# Patient Record
Sex: MALE | Race: ASIAN | NOT HISPANIC OR LATINO | URBAN - METROPOLITAN AREA
[De-identification: names, ages, dates, MRNs, and addresses within clinical notes are randomized per-mention and may not be internally consistent; named-entity substitution may affect disease eponyms.]

---

## 2022-05-07 ENCOUNTER — INPATIENT (INPATIENT)
Facility: HOSPITAL | Age: 52
LOS: 1 days | Discharge: HOME | End: 2022-05-09
Attending: HOSPITALIST | Admitting: HOSPITALIST
Payer: MEDICAID

## 2022-05-07 VITALS
SYSTOLIC BLOOD PRESSURE: 137 MMHG | HEART RATE: 113 BPM | RESPIRATION RATE: 15 BRPM | TEMPERATURE: 98 F | DIASTOLIC BLOOD PRESSURE: 96 MMHG | OXYGEN SATURATION: 99 %

## 2022-05-07 DIAGNOSIS — J18.9 PNEUMONIA, UNSPECIFIED ORGANISM: ICD-10-CM

## 2022-05-07 LAB
ALBUMIN SERPL ELPH-MCNC: 3 G/DL — LOW (ref 3.3–5)
ALP SERPL-CCNC: 301 U/L — HIGH (ref 40–120)
ALT FLD-CCNC: 206 U/L — HIGH (ref 4–41)
ANION GAP SERPL CALC-SCNC: 12 MMOL/L — SIGNIFICANT CHANGE UP (ref 7–14)
APPEARANCE UR: CLEAR — SIGNIFICANT CHANGE UP
AST SERPL-CCNC: 195 U/L — HIGH (ref 4–40)
BASOPHILS # BLD AUTO: 0.03 K/UL — SIGNIFICANT CHANGE UP (ref 0–0.2)
BASOPHILS NFR BLD AUTO: 0.2 % — SIGNIFICANT CHANGE UP (ref 0–2)
BILIRUB SERPL-MCNC: 0.4 MG/DL — SIGNIFICANT CHANGE UP (ref 0.2–1.2)
BILIRUB UR-MCNC: NEGATIVE — SIGNIFICANT CHANGE UP
BLOOD GAS VENOUS COMPREHENSIVE RESULT: SIGNIFICANT CHANGE UP
BUN SERPL-MCNC: 14 MG/DL — SIGNIFICANT CHANGE UP (ref 7–23)
CALCIUM SERPL-MCNC: 8.8 MG/DL — SIGNIFICANT CHANGE UP (ref 8.4–10.5)
CHLORIDE SERPL-SCNC: 101 MMOL/L — SIGNIFICANT CHANGE UP (ref 98–107)
CO2 SERPL-SCNC: 24 MMOL/L — SIGNIFICANT CHANGE UP (ref 22–31)
COLOR SPEC: YELLOW — SIGNIFICANT CHANGE UP
CREAT SERPL-MCNC: 0.68 MG/DL — SIGNIFICANT CHANGE UP (ref 0.5–1.3)
DIFF PNL FLD: NEGATIVE — SIGNIFICANT CHANGE UP
EGFR: 112 ML/MIN/1.73M2 — SIGNIFICANT CHANGE UP
EOSINOPHIL # BLD AUTO: 0.12 K/UL — SIGNIFICANT CHANGE UP (ref 0–0.5)
EOSINOPHIL NFR BLD AUTO: 0.8 % — SIGNIFICANT CHANGE UP (ref 0–6)
FLUAV AG NPH QL: SIGNIFICANT CHANGE UP
FLUBV AG NPH QL: SIGNIFICANT CHANGE UP
GLUCOSE SERPL-MCNC: 124 MG/DL — HIGH (ref 70–99)
GLUCOSE UR QL: NEGATIVE — SIGNIFICANT CHANGE UP
HCT VFR BLD CALC: 34.5 % — LOW (ref 39–50)
HGB BLD-MCNC: 11.3 G/DL — LOW (ref 13–17)
IANC: 12.73 K/UL — HIGH (ref 1.8–7.4)
IMM GRANULOCYTES NFR BLD AUTO: 0.6 % — SIGNIFICANT CHANGE UP (ref 0–1.5)
KETONES UR-MCNC: NEGATIVE — SIGNIFICANT CHANGE UP
LEUKOCYTE ESTERASE UR-ACNC: NEGATIVE — SIGNIFICANT CHANGE UP
LYMPHOCYTES # BLD AUTO: 1.51 K/UL — SIGNIFICANT CHANGE UP (ref 1–3.3)
LYMPHOCYTES # BLD AUTO: 9.7 % — LOW (ref 13–44)
MCHC RBC-ENTMCNC: 30.6 PG — SIGNIFICANT CHANGE UP (ref 27–34)
MCHC RBC-ENTMCNC: 32.8 GM/DL — SIGNIFICANT CHANGE UP (ref 32–36)
MCV RBC AUTO: 93.5 FL — SIGNIFICANT CHANGE UP (ref 80–100)
MONOCYTES # BLD AUTO: 1.06 K/UL — HIGH (ref 0–0.9)
MONOCYTES NFR BLD AUTO: 6.8 % — SIGNIFICANT CHANGE UP (ref 2–14)
NEUTROPHILS # BLD AUTO: 12.73 K/UL — HIGH (ref 1.8–7.4)
NEUTROPHILS NFR BLD AUTO: 81.9 % — HIGH (ref 43–77)
NITRITE UR-MCNC: NEGATIVE — SIGNIFICANT CHANGE UP
NRBC # BLD: 0 /100 WBCS — SIGNIFICANT CHANGE UP
NRBC # FLD: 0 K/UL — SIGNIFICANT CHANGE UP
PH UR: 8.5 — HIGH (ref 5–8)
PLATELET # BLD AUTO: 544 K/UL — HIGH (ref 150–400)
POTASSIUM SERPL-MCNC: 4.3 MMOL/L — SIGNIFICANT CHANGE UP (ref 3.5–5.3)
POTASSIUM SERPL-SCNC: 4.3 MMOL/L — SIGNIFICANT CHANGE UP (ref 3.5–5.3)
PROT SERPL-MCNC: 8.4 G/DL — HIGH (ref 6–8.3)
PROT UR-MCNC: ABNORMAL
RBC # BLD: 3.69 M/UL — LOW (ref 4.2–5.8)
RBC # FLD: 13.1 % — SIGNIFICANT CHANGE UP (ref 10.3–14.5)
RSV RNA NPH QL NAA+NON-PROBE: SIGNIFICANT CHANGE UP
SARS-COV-2 RNA SPEC QL NAA+PROBE: SIGNIFICANT CHANGE UP
SODIUM SERPL-SCNC: 137 MMOL/L — SIGNIFICANT CHANGE UP (ref 135–145)
SP GR SPEC: 1.03 — SIGNIFICANT CHANGE UP (ref 1–1.05)
UROBILINOGEN FLD QL: ABNORMAL
WBC # BLD: 15.54 K/UL — HIGH (ref 3.8–10.5)
WBC # FLD AUTO: 15.54 K/UL — HIGH (ref 3.8–10.5)

## 2022-05-07 PROCEDURE — 71045 X-RAY EXAM CHEST 1 VIEW: CPT | Mod: 26

## 2022-05-07 PROCEDURE — 76705 ECHO EXAM OF ABDOMEN: CPT | Mod: 26

## 2022-05-07 PROCEDURE — 99285 EMERGENCY DEPT VISIT HI MDM: CPT

## 2022-05-07 PROCEDURE — 74177 CT ABD & PELVIS W/CONTRAST: CPT | Mod: 26,MA

## 2022-05-07 RX ORDER — SODIUM CHLORIDE 9 MG/ML
1000 INJECTION INTRAMUSCULAR; INTRAVENOUS; SUBCUTANEOUS ONCE
Refills: 0 | Status: COMPLETED | OUTPATIENT
Start: 2022-05-07 | End: 2022-05-07

## 2022-05-07 RX ORDER — SODIUM CHLORIDE 9 MG/ML
1000 INJECTION, SOLUTION INTRAVENOUS ONCE
Refills: 0 | Status: COMPLETED | OUTPATIENT
Start: 2022-05-07 | End: 2022-05-07

## 2022-05-07 RX ORDER — LANOLIN ALCOHOL/MO/W.PET/CERES
3 CREAM (GRAM) TOPICAL AT BEDTIME
Refills: 0 | Status: DISCONTINUED | OUTPATIENT
Start: 2022-05-07 | End: 2022-05-09

## 2022-05-07 RX ORDER — PIPERACILLIN AND TAZOBACTAM 4; .5 G/20ML; G/20ML
3.38 INJECTION, POWDER, LYOPHILIZED, FOR SOLUTION INTRAVENOUS ONCE
Refills: 0 | Status: COMPLETED | OUTPATIENT
Start: 2022-05-07 | End: 2022-05-07

## 2022-05-07 RX ORDER — ENOXAPARIN SODIUM 100 MG/ML
40 INJECTION SUBCUTANEOUS EVERY 24 HOURS
Refills: 0 | Status: DISCONTINUED | OUTPATIENT
Start: 2022-05-07 | End: 2022-05-09

## 2022-05-07 RX ORDER — KETOROLAC TROMETHAMINE 30 MG/ML
15 SYRINGE (ML) INJECTION ONCE
Refills: 0 | Status: DISCONTINUED | OUTPATIENT
Start: 2022-05-07 | End: 2022-05-07

## 2022-05-07 RX ADMIN — SODIUM CHLORIDE 1000 MILLILITER(S): 9 INJECTION INTRAMUSCULAR; INTRAVENOUS; SUBCUTANEOUS at 15:12

## 2022-05-07 RX ADMIN — Medication 3 MILLIGRAM(S): at 23:38

## 2022-05-07 RX ADMIN — ENOXAPARIN SODIUM 40 MILLIGRAM(S): 100 INJECTION SUBCUTANEOUS at 23:38

## 2022-05-07 RX ADMIN — Medication 15 MILLIGRAM(S): at 15:12

## 2022-05-07 RX ADMIN — PIPERACILLIN AND TAZOBACTAM 200 GRAM(S): 4; .5 INJECTION, POWDER, LYOPHILIZED, FOR SOLUTION INTRAVENOUS at 17:37

## 2022-05-07 RX ADMIN — SODIUM CHLORIDE 1000 MILLILITER(S): 9 INJECTION, SOLUTION INTRAVENOUS at 23:39

## 2022-05-07 NOTE — ED ADULT TRIAGE NOTE - CHIEF COMPLAINT QUOTE
Pt c/o chest pain radiating to left upper back beginning last night, worse this morning. pain described as "heaviness." endorsing SOB, pt respirations even and unlabored w/ O2 sat on RA 99%. Pt denies dizziness, weakness, n/v. Denies PMHx.

## 2022-05-07 NOTE — ED PROVIDER NOTE - PROGRESS NOTE DETAILS
JAISON Humphreys. PGY-3: CT AP showing L lower lung PNA, given abx. Will admit for IV abx and continued monitoring, remains tachypneic but not requiring O2, no retractions on exam

## 2022-05-07 NOTE — ED PROVIDER NOTE - ATTENDING CONTRIBUTION TO CARE
1yom with unknown PMH presents with 3 weeks of fevers a/w L flank and chest pain. Denies cough, sob, +n/v, no recent travel. Pt stable on exam, notable tachycardia in s/o fever, given abx, plan for CTAP to eval for kidney stone/infarct/abscess. LFTs also elevated and pt with some R sided pain as well, RUQ US ordered to eval for cholecystitis.

## 2022-05-07 NOTE — ED PROVIDER NOTE - PHYSICAL EXAMINATION
Mentation: AAO x 3  psych: mood appropriate  ENT: airway patent  Eyes: conjunctivae clear bilaterally  Cardio: RRR, no m/r/g  Resp: normal BS b/l  GI: mild LUQ tenderness  : +L CVA tenderness  Neuro: sensation and motor function intact  Skin: No evidence of rash  MSK: normal movement of all extremities  Lymph/Vasc: no LE edema

## 2022-05-07 NOTE — ED ADULT NURSE NOTE - OBJECTIVE STATEMENT
Pt AOX4 c/o 15 days of Left flank pain and fever, 10 days of Left-sided chest pain, denies SOB; denies any urinary symptoms; wife states he has been losing weight and not eating or drinking much; skin dry warm and intact; rectal temp 100.6; pt denies n,v, d; takes no meds but hasn't seen an MD in decades. Labs drawn and sent. Awaiting further orders.

## 2022-05-07 NOTE — ED PROVIDER NOTE - OBJECTIVE STATEMENT
52 y/o M with no reported PMH presenting with L sided chest/abd pain x2 weeks. Patient reports fevers x1 week as well. States gets improvement of fever with tylenol or motrin. Denies any cough, sob, n/v/d, dysuria, hematuria.  Patient has not seen a doctor ever

## 2022-05-07 NOTE — ED PROVIDER NOTE - CLINICAL SUMMARY MEDICAL DECISION MAKING FREE TEXT BOX
JAISON Humphreys. PGY-3: 50 y/o M presenting with 1 week of fevers, +CVA tenderness on exam, will eval for pyelo/septic stone. labs, cultures, CXR, CT AP, fluids, anti-pyretics. Dispo pending

## 2022-05-08 DIAGNOSIS — R07.9 CHEST PAIN, UNSPECIFIED: ICD-10-CM

## 2022-05-08 DIAGNOSIS — R74.01 ELEVATION OF LEVELS OF LIVER TRANSAMINASE LEVELS: ICD-10-CM

## 2022-05-08 DIAGNOSIS — F10.10 ALCOHOL ABUSE, UNCOMPLICATED: ICD-10-CM

## 2022-05-08 DIAGNOSIS — D64.9 ANEMIA, UNSPECIFIED: ICD-10-CM

## 2022-05-08 DIAGNOSIS — R01.1 CARDIAC MURMUR, UNSPECIFIED: ICD-10-CM

## 2022-05-08 DIAGNOSIS — I10 ESSENTIAL (PRIMARY) HYPERTENSION: ICD-10-CM

## 2022-05-08 DIAGNOSIS — Z29.9 ENCOUNTER FOR PROPHYLACTIC MEASURES, UNSPECIFIED: ICD-10-CM

## 2022-05-08 DIAGNOSIS — J18.9 PNEUMONIA, UNSPECIFIED ORGANISM: ICD-10-CM

## 2022-05-08 DIAGNOSIS — A41.9 SEPSIS, UNSPECIFIED ORGANISM: ICD-10-CM

## 2022-05-08 LAB
A1C WITH ESTIMATED AVERAGE GLUCOSE RESULT: 5.8 % — HIGH (ref 4–5.6)
ALBUMIN SERPL ELPH-MCNC: 2.4 G/DL — LOW (ref 3.3–5)
ALP SERPL-CCNC: 264 U/L — HIGH (ref 40–120)
ALT FLD-CCNC: 212 U/L — HIGH (ref 4–41)
ANION GAP SERPL CALC-SCNC: 14 MMOL/L — SIGNIFICANT CHANGE UP (ref 7–14)
APTT BLD: 28.1 SEC — SIGNIFICANT CHANGE UP (ref 27–36.3)
AST SERPL-CCNC: 194 U/L — HIGH (ref 4–40)
B PERT DNA SPEC QL NAA+PROBE: SIGNIFICANT CHANGE UP
B PERT+PARAPERT DNA PNL SPEC NAA+PROBE: SIGNIFICANT CHANGE UP
BASOPHILS # BLD AUTO: 0.03 K/UL — SIGNIFICANT CHANGE UP (ref 0–0.2)
BASOPHILS NFR BLD AUTO: 0.2 % — SIGNIFICANT CHANGE UP (ref 0–2)
BILIRUB SERPL-MCNC: 0.4 MG/DL — SIGNIFICANT CHANGE UP (ref 0.2–1.2)
BORDETELLA PARAPERTUSSIS (RAPRVP): SIGNIFICANT CHANGE UP
BUN SERPL-MCNC: 13 MG/DL — SIGNIFICANT CHANGE UP (ref 7–23)
C PNEUM DNA SPEC QL NAA+PROBE: SIGNIFICANT CHANGE UP
CALCIUM SERPL-MCNC: 8.4 MG/DL — SIGNIFICANT CHANGE UP (ref 8.4–10.5)
CHLORIDE SERPL-SCNC: 103 MMOL/L — SIGNIFICANT CHANGE UP (ref 98–107)
CHOLEST SERPL-MCNC: 106 MG/DL — SIGNIFICANT CHANGE UP
CO2 SERPL-SCNC: 20 MMOL/L — LOW (ref 22–31)
CREAT SERPL-MCNC: 0.66 MG/DL — SIGNIFICANT CHANGE UP (ref 0.5–1.3)
CULTURE RESULTS: SIGNIFICANT CHANGE UP
EGFR: 113 ML/MIN/1.73M2 — SIGNIFICANT CHANGE UP
EOSINOPHIL # BLD AUTO: 0.1 K/UL — SIGNIFICANT CHANGE UP (ref 0–0.5)
EOSINOPHIL NFR BLD AUTO: 0.7 % — SIGNIFICANT CHANGE UP (ref 0–6)
ESTIMATED AVERAGE GLUCOSE: 120 — SIGNIFICANT CHANGE UP
FERRITIN SERPL-MCNC: 1572 NG/ML — HIGH (ref 30–400)
FLUAV SUBTYP SPEC NAA+PROBE: SIGNIFICANT CHANGE UP
FLUBV RNA SPEC QL NAA+PROBE: SIGNIFICANT CHANGE UP
GLUCOSE SERPL-MCNC: 102 MG/DL — HIGH (ref 70–99)
HADV DNA SPEC QL NAA+PROBE: SIGNIFICANT CHANGE UP
HAV IGM SER-ACNC: SIGNIFICANT CHANGE UP
HBV CORE IGM SER-ACNC: SIGNIFICANT CHANGE UP
HBV SURFACE AB SER-ACNC: SIGNIFICANT CHANGE UP
HBV SURFACE AG SER-ACNC: SIGNIFICANT CHANGE UP
HCOV 229E RNA SPEC QL NAA+PROBE: SIGNIFICANT CHANGE UP
HCOV HKU1 RNA SPEC QL NAA+PROBE: SIGNIFICANT CHANGE UP
HCOV NL63 RNA SPEC QL NAA+PROBE: SIGNIFICANT CHANGE UP
HCOV OC43 RNA SPEC QL NAA+PROBE: SIGNIFICANT CHANGE UP
HCT VFR BLD CALC: 32.1 % — LOW (ref 39–50)
HCV AB S/CO SERPL IA: 0.26 S/CO — SIGNIFICANT CHANGE UP (ref 0–0.99)
HCV AB SERPL-IMP: SIGNIFICANT CHANGE UP
HDLC SERPL-MCNC: 29 MG/DL — LOW
HGB BLD-MCNC: 10.7 G/DL — LOW (ref 13–17)
HMPV RNA SPEC QL NAA+PROBE: SIGNIFICANT CHANGE UP
HPIV1 RNA SPEC QL NAA+PROBE: SIGNIFICANT CHANGE UP
HPIV2 RNA SPEC QL NAA+PROBE: SIGNIFICANT CHANGE UP
HPIV3 RNA SPEC QL NAA+PROBE: SIGNIFICANT CHANGE UP
HPIV4 RNA SPEC QL NAA+PROBE: SIGNIFICANT CHANGE UP
IANC: 11.41 K/UL — HIGH (ref 1.8–7.4)
IMM GRANULOCYTES NFR BLD AUTO: 0.6 % — SIGNIFICANT CHANGE UP (ref 0–1.5)
INR BLD: 1.32 RATIO — HIGH (ref 0.88–1.16)
IRON SATN MFR SERPL: 30 % — SIGNIFICANT CHANGE UP (ref 14–50)
IRON SATN MFR SERPL: 31 UG/DL — LOW (ref 45–165)
LIPID PNL WITH DIRECT LDL SERPL: 60 MG/DL — SIGNIFICANT CHANGE UP
LYMPHOCYTES # BLD AUTO: 1.91 K/UL — SIGNIFICANT CHANGE UP (ref 1–3.3)
LYMPHOCYTES # BLD AUTO: 13.2 % — SIGNIFICANT CHANGE UP (ref 13–44)
M PNEUMO DNA SPEC QL NAA+PROBE: SIGNIFICANT CHANGE UP
MCHC RBC-ENTMCNC: 30.7 PG — SIGNIFICANT CHANGE UP (ref 27–34)
MCHC RBC-ENTMCNC: 33.3 GM/DL — SIGNIFICANT CHANGE UP (ref 32–36)
MCV RBC AUTO: 92.2 FL — SIGNIFICANT CHANGE UP (ref 80–100)
MONOCYTES # BLD AUTO: 0.93 K/UL — HIGH (ref 0–0.9)
MONOCYTES NFR BLD AUTO: 6.4 % — SIGNIFICANT CHANGE UP (ref 2–14)
NEUTROPHILS # BLD AUTO: 11.41 K/UL — HIGH (ref 1.8–7.4)
NEUTROPHILS NFR BLD AUTO: 78.9 % — HIGH (ref 43–77)
NON HDL CHOLESTEROL: 77 MG/DL — SIGNIFICANT CHANGE UP
NRBC # BLD: 0 /100 WBCS — SIGNIFICANT CHANGE UP
NRBC # FLD: 0 K/UL — SIGNIFICANT CHANGE UP
PLATELET # BLD AUTO: 507 K/UL — HIGH (ref 150–400)
POTASSIUM SERPL-MCNC: 3.9 MMOL/L — SIGNIFICANT CHANGE UP (ref 3.5–5.3)
POTASSIUM SERPL-SCNC: 3.9 MMOL/L — SIGNIFICANT CHANGE UP (ref 3.5–5.3)
PROT SERPL-MCNC: 7.1 G/DL — SIGNIFICANT CHANGE UP (ref 6–8.3)
PROTHROM AB SERPL-ACNC: 15.4 SEC — HIGH (ref 10.5–13.4)
RAPID RVP RESULT: SIGNIFICANT CHANGE UP
RBC # BLD: 3.48 M/UL — LOW (ref 4.2–5.8)
RBC # FLD: 13.2 % — SIGNIFICANT CHANGE UP (ref 10.3–14.5)
RSV RNA SPEC QL NAA+PROBE: SIGNIFICANT CHANGE UP
RV+EV RNA SPEC QL NAA+PROBE: SIGNIFICANT CHANGE UP
SARS-COV-2 RNA SPEC QL NAA+PROBE: SIGNIFICANT CHANGE UP
SODIUM SERPL-SCNC: 137 MMOL/L — SIGNIFICANT CHANGE UP (ref 135–145)
SPECIMEN SOURCE: SIGNIFICANT CHANGE UP
TIBC SERPL-MCNC: 103 UG/DL — LOW (ref 220–430)
TRIGL SERPL-MCNC: 83 MG/DL — SIGNIFICANT CHANGE UP
UIBC SERPL-MCNC: 72 UG/DL — LOW (ref 110–370)
WBC # BLD: 14.46 K/UL — HIGH (ref 3.8–10.5)
WBC # FLD AUTO: 14.46 K/UL — HIGH (ref 3.8–10.5)

## 2022-05-08 PROCEDURE — 93306 TTE W/DOPPLER COMPLETE: CPT | Mod: 26

## 2022-05-08 PROCEDURE — 99223 1ST HOSP IP/OBS HIGH 75: CPT | Mod: GC

## 2022-05-08 PROCEDURE — 99232 SBSQ HOSP IP/OBS MODERATE 35: CPT

## 2022-05-08 RX ORDER — ACETAMINOPHEN 500 MG
650 TABLET ORAL EVERY 6 HOURS
Refills: 0 | Status: DISCONTINUED | OUTPATIENT
Start: 2022-05-08 | End: 2022-05-09

## 2022-05-08 RX ORDER — IBUPROFEN 200 MG
400 TABLET ORAL ONCE
Refills: 0 | Status: DISCONTINUED | OUTPATIENT
Start: 2022-05-08 | End: 2022-05-08

## 2022-05-08 RX ORDER — AZITHROMYCIN 500 MG/1
500 TABLET, FILM COATED ORAL DAILY
Refills: 0 | Status: DISCONTINUED | OUTPATIENT
Start: 2022-05-08 | End: 2022-05-08

## 2022-05-08 RX ORDER — AZITHROMYCIN 500 MG/1
500 TABLET, FILM COATED ORAL EVERY 24 HOURS
Refills: 0 | Status: DISCONTINUED | OUTPATIENT
Start: 2022-05-09 | End: 2022-05-09

## 2022-05-08 RX ORDER — SODIUM CHLORIDE 9 MG/ML
4 INJECTION INTRAMUSCULAR; INTRAVENOUS; SUBCUTANEOUS ONCE
Refills: 0 | Status: COMPLETED | OUTPATIENT
Start: 2022-05-08 | End: 2022-05-08

## 2022-05-08 RX ORDER — ACETAMINOPHEN 500 MG
650 TABLET ORAL ONCE
Refills: 0 | Status: COMPLETED | OUTPATIENT
Start: 2022-05-08 | End: 2022-05-08

## 2022-05-08 RX ORDER — AZITHROMYCIN 500 MG/1
TABLET, FILM COATED ORAL
Refills: 0 | Status: DISCONTINUED | OUTPATIENT
Start: 2022-05-08 | End: 2022-05-09

## 2022-05-08 RX ORDER — AZITHROMYCIN 500 MG/1
500 TABLET, FILM COATED ORAL ONCE
Refills: 0 | Status: COMPLETED | OUTPATIENT
Start: 2022-05-08 | End: 2022-05-08

## 2022-05-08 RX ORDER — CEFTRIAXONE 500 MG/1
1000 INJECTION, POWDER, FOR SOLUTION INTRAMUSCULAR; INTRAVENOUS EVERY 24 HOURS
Refills: 0 | Status: DISCONTINUED | OUTPATIENT
Start: 2022-05-08 | End: 2022-05-09

## 2022-05-08 RX ADMIN — CEFTRIAXONE 100 MILLIGRAM(S): 500 INJECTION, POWDER, FOR SOLUTION INTRAMUSCULAR; INTRAVENOUS at 06:28

## 2022-05-08 RX ADMIN — SODIUM CHLORIDE 4 MILLILITER(S): 9 INJECTION INTRAMUSCULAR; INTRAVENOUS; SUBCUTANEOUS at 09:40

## 2022-05-08 RX ADMIN — AZITHROMYCIN 255 MILLIGRAM(S): 500 TABLET, FILM COATED ORAL at 09:12

## 2022-05-08 RX ADMIN — Medication 3 MILLIGRAM(S): at 23:19

## 2022-05-08 RX ADMIN — Medication 650 MILLIGRAM(S): at 10:02

## 2022-05-08 RX ADMIN — Medication 650 MILLIGRAM(S): at 09:09

## 2022-05-08 RX ADMIN — ENOXAPARIN SODIUM 40 MILLIGRAM(S): 100 INJECTION SUBCUTANEOUS at 23:19

## 2022-05-08 NOTE — PROGRESS NOTE ADULT - PROBLEM SELECTOR PLAN 7
140s-150s systolic during admission.  Patient with no medical care outpatient, unknown baseline given this is acute episode of illness.  Potentially pain induced, likely underlying Dx of essential hypertension.  - Monitor overnight  - consider start on BP regimen if persistently elevated, depending on if A1C would consider ACE/ARB, and consider DC on BP medications   - needs PCP f/u, needs actual PCP as does not have. 140s-150s systolic during admission.  -Patient with no medical care outpatient, unknown baseline given this is acute episode of illness.  -Potentially pain induced, likely underlying Dx of essential hypertension.  - consider start on BP regimen if persistently elevated, depending on if A1C would consider ACE/ARB, and consider DC on BP medications   - needs PCP f/u, needs actual PCP as does not have.

## 2022-05-08 NOTE — H&P ADULT - NSHPSOCIALHISTORY_GEN_ALL_CORE
Patient lives "with a few friends" works in wholesale cellphone industry.     Tobacco: Denies  Alcohol: 1 glass of wine and 2 shots of hennesy daily, no hx of withdrawal symptoms, shaking, seizures, or agitation.   Other substances: Patient grinds up poppy seed flowers and eats that daily "for energy," when doesn't take, reports feeling week. Patient lives "with a few friends." Works in wholesale cellphone industry.     Tobacco: Denies use  Alcohol: 1 glass of wine and 2 shots of [place] daily, no hx of withdrawal symptoms, shaking, seizures, or agitation.   Other substances: Patient grinds up poppy seed flowers and eats that daily "for energy," when doesn't take, reports feeling week.

## 2022-05-08 NOTE — H&P ADULT - PROBLEM SELECTOR PLAN 4
1 Glass wine, 2 shots Cary qd  No hx of withdrawal symptoms, no seizures, shaking.   - Low risk CIWA symptoms triggered protocol   - Discuss cessation prior to DC L-sided, nonradiating, intermittent, lasts up to 1h. Says rubbing improves pain. Reproducible on exam. Possibly PNA-associated pain vs. MSK pain.   - HsT <6. EKG nonischemic.   - F/u TTE  - PO Tylenol PRN   - Statin therapy if needed depending on ASCVD and A1C risk factors   - Outpatient f/u w PCP

## 2022-05-08 NOTE — PROGRESS NOTE ADULT - ASSESSMENT
51yom with unknown PMH presents with 3 weeks of fevers a/w acute non-anginal chest pain, weakness, and weight loss admitted for treatment of sepsis 2/2 left lower lobe pneumonia.       51yom with unknown PMH presents with 3 weeks of fevers a/w acute non-anginal chest pain, weakness, and weight loss admitted for treatment of sepsis 2/2 left lower lobe pneumonia, on CTX and Azithromycin, pending chest CT. Also on CIWA moderate alcohol use disorder. Pending TTE for holosystolic murmur.

## 2022-05-08 NOTE — H&P ADULT - PROBLEM SELECTOR PLAN 3
AST/ALT on arrival 195/206  Alcohol vs. Substance induced hepatitis   - Abd US showed liver wnl, contracted GB no evidence of cholecystitis.   - not on any medications, does use "poppy seed flower, ground up daily" as well as daily glass of wine and 2 shots henessee  - Trend CMP daily   - Coags in AM to r/o synthetic liver dysfunction AST/ALT on arrival 195/206  Alcohol vs. Substance induced hepatitis   - Abd US showed liver wnl, contracted GB no evidence of cholecystitis.   - not on any medications, does use "poppy seed flower, ground up daily" as well as daily glass of wine and 2 shots henessee  - Trend CMP daily   - Coags in AM to r/o synthetic liver dysfunction  - f/u Hepatitis panel AST/ALT on admission 195/206. Can be in setting of sepsis or viral vs. substance-induced hepatitis.  - Abdominal US showed liver wnl and contracted GB with no evidence of cholecystitis  - Pt not on any medications, does use "poppy seed flower, ground up daily" and is daily alcohol drinker  - F/u acute viral hepatitis panel   - Trend CMP daily   - Check coags in AM

## 2022-05-08 NOTE — H&P ADULT - HISTORY OF PRESENT ILLNESS
51yom with no known PMH and no medical followup, immigrated from [place] 12y ago, presenting for reportedly 3 weeks of fevers that resolve temporarily with tylenol as well as chest/abdominal pain. The pain is mostly in the left chest, 7/10 in severity, intermittent, lasts up to 1h at a time, and not associated with radiation. Patient reports palpation/massage usually relieves the pain. Patient denies coughing. He does endorse some morning nasal congestion that started prior to the fevers, denies headache or facial pain. He does report losing about 15 lb over the past few weeks, and poor hydration as well as generalized weakness. Denies any known medical conditions, denies any history of chest pain in the past prior to this presentation, denies leg swelling, leg pain. Denies dysuria, occasional flank pain, denies n/v/d/ constipation. Denies seeing medical professionals for care in past.    ED Course: Tmax 100.8, , /96, RR 15, Sat 99% on RA.   Patient received 15 IV toradol, Zosyn, 1L NS bolus.  52 yo man with no known PMH and no medical followup, immigrated from [place] 12y ago, presenting for reportedly 3 weeks of fevers that resolve temporarily with tylenol as well as chest/abdominal pain. The pain is mostly in the left chest, 7/10 in severity, intermittent, lasts up to 1h at a time, and not associated with radiation. Patient reports palpation/massage usually relieves the pain. Patient denies coughing. He does endorse some morning nasal congestion that started prior to the fevers, denies headache or facial pain. He does report losing about 15 lb over the past few weeks, and poor hydration as well as generalized weakness. Denies any known medical conditions, denies any history of chest pain in the past prior to this presentation, denies leg swelling, leg pain. Denies dysuria, occasional flank pain, denies n/v/d/ constipation. Denies seeing medical professionals for care in past.    ED Course: Tmax 100.8, , /96, RR 15, Sat 99% on RA.   Patient received 15 IV toradol, Zosyn, 1L NS bolus.  52 yo man with no known PMH and no medical followup but with daily alcohol use (1 glass of wine, 2 shots of [place]), immigrated from [place] 12y ago, presenting for reportedly 3 weeks of fevers and chills that resolve temporarily with Tylenol as well as chest/abdominal pain. The pain is mostly in the left chest, 7/10 in severity, intermittent, lasts up to 1h at a time, and not associated with radiation. Patient reports palpation/massage usually relieves the pain. Patient denies coughing. He does endorse some morning nasal congestion that started prior to the fevers, denies headache or facial pain. He also reports losing about 15 lbs over the past few weeks despite no difference in his PO intake and night sweats that start shortly after he falls asleep. He states that he has been experiencing generalized weakness as a result. Denies any known medical conditions, denies any history of chest pain in the past prior to this presentation, denies leg swelling or leg pain. Denies dysuria, occasional flank pain, denies n/v/d/ constipation. Denies seeing medical professionals for care in past.    ED Course: Tmax 100.8, , /96, RR 15, Sat 99% on RA.   Patient received 15 IV toradol, Zosyn, 1L NS bolus.

## 2022-05-08 NOTE — H&P ADULT - ATTENDING COMMENTS
52 yo man with no known PMH and no medical followup but with daily alcohol use (1 glass of wine, 2 shots of [place]), immigrated from [place] 12y ago, presenting for 3 weeks of fevers/chills, night sweats, weight loss possibly 2/2 bacterial PNA, will r/o pulm TB. CXR with possible LLL PNA and CTAP showing GGO in AC. On ceftriaxone and azithromycin for now. CT chest noncontrast pending. Also pending sputum AFB x3 negative to r/o pulm TB given night sweats and unintentional weight loss. Airborne precautions. ID consult to be called in AM.

## 2022-05-08 NOTE — H&P ADULT - PROBLEM SELECTOR PLAN 6
Reproducible on exam, Left sided, 7/120, nonradiated, intermittent lasts up to 1h. Says rubbing improves pain  Trop negative, nonanginal CP (location, but not stress induced and not improve with rest).   likely MSK pain vs. PNA associated pain  - statin therapy if needed depending on ASCVD and A1C risk factors   - outpatient f/u w PCP Reproducible on exam, Left sided, 7/12, nonradiated, intermittent lasts up to 1h. Says rubbing improves pain  Trop negative, nonanginal CP (location, but not stress induced and not improve with rest).   likely MSK pain vs. PNA associated pain  - statin therapy if needed depending on ASCVD and A1C risk factors   - outpatient f/u w PCP No clinical evidence of CHF. Worse in tricuspid region, also in mitral region. 4/6 on exam.   DDx includes mitral regurgitation, tricuspid regurgitation, or hypovolemia from sepsis.  Need to r/o significant valvular disease, as pt immigrant from [place] 12y ago ?rheumatic heart disease. Need to r/o endocarditis, unknown if bacteremia present, denies IV drug abuse.   - TTE ordered  - F/u blood cxs

## 2022-05-08 NOTE — H&P ADULT - NSHPPHYSICALEXAM_GEN_ALL_CORE
ICU Vital Signs Last 24 Hrs  T(C): 37.3 (07 May 2022 22:01), Max: 38.1 (07 May 2022 14:49)  T(F): 99.1 (07 May 2022 22:01), Max: 100.6 (07 May 2022 14:49)  HR: 95 (07 May 2022 22:01) (95 - 113)  BP: 138/90 (07 May 2022 22:01) (137/96 - 151/93)  RR: 17 (07 May 2022 22:01) (15 - 18)  SpO2: 99% (07 May 2022 22:01) (99% - 100%)    GENERAL APPEARANCE: Well developed, NAD  HEENT:  PERRL, EOMI. hearing grossly intact.  NECK: Neck supple, non-tender no lymphadenopathy, masses or thyromegaly.  CARDIAC: Tachycardic rate, 4/6 holosystolic murmur best appreciated in tricuspid and mitral regions, no radiation appreciated. Left chest tender to palpation.   LUNGS: Decreased lung sounds on left side. R lung normal breath sounds, no rhonchi or wheezes appreciated. Patient not breathing very deeply.   ABDOMEN: Slightly tender, slightly distended. Normal BS. No guarding or rebound.   MUSCULOSKELETAL: ROM intact.  No joint erythema or tenderness.   EXTREMITIES: No edema. Peripheral pulses intact. DP and PT pulses appreciated bilaterally.   NEUROLOGICAL: Non focal. Strength and sensation symmetric and intact throughout.   SKIN: Warm and dry , Well perfused  PSYCHIATRIC: AOx4, Normal mood and affect Vital Signs Last 24 Hrs  T(C): 37.3 (07 May 2022 22:01), Max: 38.1 (07 May 2022 14:49)  T(F): 99.1 (07 May 2022 22:01), Max: 100.6 (07 May 2022 14:49)  HR: 95 (07 May 2022 22:01) (95 - 113)  BP: 138/90 (07 May 2022 22:01) (137/96 - 151/93)  RR: 17 (07 May 2022 22:01) (15 - 18)  SpO2: 99% (07 May 2022 22:01) (99% - 100%)    PHYSICAL EXAM:  General: Awake and alert.  No acute distress.  Head: Normocephalic, atraumatic.    Eyes: PERRL.  EOMI.  No scleral icterus.  No conjunctival pallor.  Mouth: No tongue fasciculations.  Moist MM.  No oropharyngeal exudates.    Neck: Supple.  Full range of motion.  No JVD.  No LAD.  No thyromegaly.  Trachea midline.    Heart: Tachycardic, regular rhythm.  Normal S1 and S2.  4/6 holosystolic murmur best appreciated in tricuspid and mitral regions, no radiation appreciated.  Left chest tender to palpation.  No murmurs, rubs, or gallops.  No LE edema b/l.   Lungs: Nonlabored breathing.  Poor inspiratory effort, as pt avoiding taking deep breaths.  Decreased breath sounds on L side.  No wheezes, crackles, or rhonchi.    Abdomen: BS+, soft, mildly TTP in LUQ, nondistended.  No hepatomegaly.   Skin: Warm and dry.  No rashes.  Extremities: No cyanosis.  2+ peripheral pulses b/l.  No UE tremors b/l.  Musculoskeletal: No joint deformities.  No spinal or paraspinal tenderness.  Neuro: A&Ox3.  CN II-XII intact.  5/5 motor strength in UE and LE b/l.  Tactile sensation intact in UE and LE b/l.  No focal deficits.  Psychiatric: Full affect and normal mood.

## 2022-05-08 NOTE — H&P ADULT - ASSESSMENT
51yom with unknown PMH presents with 3 weeks of fevers a/w acute non-anginal chest pain, weakness, and weight loss admitted for treatment of sepsis 2/2 left lower lobe pneumonia.       50 yo man with no known PMH and no medical followup but with daily alcohol use (1 glass of wine, 2 shots of [place]), immigrated from [place] 12y ago, presenting for 3 weeks of fevers/chills, night sweats, weight loss possibly 2/2 LLL PNA, will r/o pulm TB.        50 yo man with no known PMH and no medical followup but with daily alcohol use (1 glass of wine, 2 shots of [place]), immigrated from [place] 12y ago, presenting for 3 weeks of fevers/chills, night sweats, unintentional weight loss possibly 2/2 LLL PNA, will r/o pulm TB.

## 2022-05-08 NOTE — H&P ADULT - PROBLEM SELECTOR PLAN 8
Hb 11.3, MCV 94  - Iron studies in AM Hb 11.3, MCV 94. No S/S of active bleed.   - Check iron studies, folate, vitamin B12 in AM

## 2022-05-08 NOTE — H&P ADULT - NSHPREVIEWOFSYSTEMS_GEN_ALL_CORE
CONSTITUTIONAL:  + weight loss, fever, chills, weakness and fatigue.  HEENT:  Eyes:  No visual loss, blurred vision, double vision or yellow sclerae. Ears, Nose, Throat:  No hearing loss, sneezing, +++ congestion, No runny nose or sore throat.  SKIN:  No rash or itching.  CARDIOVASCULAR:  + chest pain  RESPIRATORY:  No shortness of breath, cough or sputum.  GASTROINTESTINAL:  No nausea, vomiting or diarrhea. No abdominal pain or blood.  GENITOURINARY:  Denies hematuria, dysuria, polyuria.   NEUROLOGICAL:  No headache, dizziness, syncope, paralysis, ataxia, numbness or tingling in the extremities. No change in bowel or bladder control.  MUSCULOSKELETAL:  No muscle, back pain, joint pain or stiffness.  HEMATOLOGIC:  No anemia, bleeding or bruising.  LYMPHATICS:  No enlarged nodes.   PSYCHIATRIC:  No history of depression or anxiety.  ENDOCRINOLOGIC:  No reports of sweating, cold or heat intolerance. No polyuria or polydipsia.  ALLERGIES:  No history of asthma, hives, eczema or rhinitis. Constitutional: +Generalized weakness, fevers, chills, and weight loss  Eyes: No visual changes, double vision, or eye pain  Ears, Nose, Mouth, Throat: +Nasal congestion. No runny nose, sinus pain, ear pain, tinnitus, sore throat, dysphagia, or odynophagia.  Cardiovascular: +L-sided chest pain/LUQ abdominal pain. No palpitations or LE edema.  Respiratory: No cough, wheezing, hemoptysis, or shortness of breath  Gastrointestinal: +L-sided chest pain/LUQ abdominal pain. No nausea/vomiting, diarrhea/constipation, hematemesis, melena, or BRBPR.  Genitourinary: No dysuria, frequency, urgency, or hematuria  Musculoskeletal: No back pain, joint pain, swelling, or decreased ROM  Skin: No pruritus or rashes  Neurologic: No syncope, seizures, headache, paresthesias, numbness, or limb weakness  Psychiatric: No depression, anxiety, or agitation  Endocrine: No heat/cold intolerance, mood swings, sweats, polydipsia, or polyuria  Hematologic/lymphatic: No purpura, petechia, or prolonged or excessive bleeding after dental extraction / injury  Allergic/Immunologic: No anaphylaxis or allergic response to materials, foods, animals    Positives and pertinent negatives noted and all other systems negative.    CONSTITUTIONAL:  + weight loss, fever, chills, weakness and fatigue.  HEENT:  Eyes:  No visual loss, blurred vision, double vision or yellow sclerae. Ears, Nose, Throat:  No hearing loss, sneezing, +++ congestion, No runny nose or sore throat.  SKIN:  No rash or itching.  CARDIOVASCULAR:  + chest pain  RESPIRATORY:  No shortness of breath, cough or sputum.  GASTROINTESTINAL:  No nausea, vomiting or diarrhea. No abdominal pain or blood.  GENITOURINARY:  Denies hematuria, dysuria, polyuria.   NEUROLOGICAL:  No headache, dizziness, syncope, paralysis, ataxia, numbness or tingling in the extremities. No change in bowel or bladder control.  MUSCULOSKELETAL:  No muscle, back pain, joint pain or stiffness.  HEMATOLOGIC:  No anemia, bleeding or bruising.  LYMPHATICS:  No enlarged nodes.   PSYCHIATRIC:  No history of depression or anxiety.  ENDOCRINOLOGIC:  No reports of sweating, cold or heat intolerance. No polyuria or polydipsia.  ALLERGIES:  No history of asthma, hives, eczema or rhinitis.

## 2022-05-08 NOTE — H&P ADULT - NSHPLABSRESULTS_GEN_ALL_CORE
Labs:                          11.3   15.54 )-----------( 544      ( 07 May 2022 15:05 )             34.5         137  |  101  |  14  ----------------------------<  124<H>  4.3   |  24  |  0.68    Ca    8.8      07 May 2022 15:05    TPro  8.4<H>  /  Alb  3.0<L>  /  TBili  0.4  /  DBili  x   /  AST  195<H>  /  ALT  206<H>  /  AlkPhos  301<H>      LIVER FUNCTIONS - ( 07 May 2022 15:05 )  Alb: 3.0 g/dL / Pro: 8.4 g/dL / ALK PHOS: 301 U/L / ALT: 206 U/L / AST: 195 U/L / GGT: x           Blood Gas Profile - Venous (22 @ 15:06)    pH, Venous: 7.41    pCO2, Venous: 44 mmHg    pO2, Venous: 23 mmHg    HCO3, Venous: 28 mmol/L    Base Excess, Venous: 2.8 mmol/L    Oxygen Saturation, Venous: 32.3 %    Total CO2, Venous: 29.3 mmol/L    Blood Gas Venous - Lactate: 1.4 mmol/L (22 @ 15:06)    Urinalysis Basic - ( 07 May 2022 15:05 )    Color: Yellow / Appearance: Clear / S.029 / pH: x  Gluc: x / Ketone: Negative  / Bili: Negative / Urobili: 6 mg/dL   Blood: x / Protein: 100 mg/dL / Nitrite: Negative   Leuk Esterase: Negative / RBC: 6 /HPF / WBC 4 /HPF   Sq Epi: x / Non Sq Epi: 2 /HPF / Bacteria: Negative    Micro:    RADIOLOGY & ADDITIONAL TESTS:    EKG:    Xray:  < from: Xray Chest 1 View- PORTABLE-Urgent (22 @ 18:14) >    IMPRESSION:  Left lower lobe pneumonia.    US/CT/MRI:  < from: CT Abdomen and Pelvis w/ IV Cont (22 @ 17:23) >    LOWER CHEST: Solid and groundglass opacities are noted within the   visualized left upper lobe. There is bibasilar subsegmental atelectasis.    LIVER: Within normal limits.  BILE DUCTS: Normal caliber.  GALLBLADDER: Within normal limits.  SPLEEN: Within normal limits.  PANCREAS: Within normal limits.  ADRENALS: Within normal limits.  KIDNEYS/URETERS: Within normal limits.    BLADDER: Within normal limits.  REPRODUCTIVE ORGANS: Within normal limits.    BOWEL: No bowel obstruction. The appendix is normal.  PERITONEUM: No ascites.  VESSELS:  Within normal limits.  RETROPERITONEUM/LYMPH NODES: No lymphadenopathy.  ABDOMINAL WALL: Within normal limits.  BONES: Within normal limits.    IMPRESSION: Solid and groundglass opacities are noted within the  visualized left upper lobe may represent pneumonia; follow-up chest CT is   recommended    < from: US Abdomen Upper Quadrant Right (22 @ 17:05) >  IMPRESSION:  No focal mass lesions, cholelithiasis, or intraductal biliary dilatation.    The gallbladder is contracted without evidence of acute cholecystitis. Labs:                          11.3   15.54 )-----------( 544      ( 07 May 2022 15:05 )             34.5         137  |  101  |  14  ----------------------------<  124<H>  4.3   |  24  |  0.68    Ca    8.8      07 May 2022 15:05    TPro  8.4<H>  /  Alb  3.0<L>  /  TBili  0.4  /  DBili  x   /  AST  195<H>  /  ALT  206<H>  /  AlkPhos  301<H>      LIVER FUNCTIONS - ( 07 May 2022 15:05 )  Alb: 3.0 g/dL / Pro: 8.4 g/dL / ALK PHOS: 301 U/L / ALT: 206 U/L / AST: 195 U/L / GGT: x           Blood Gas Profile - Venous (22 @ 15:06)    pH, Venous: 7.41    pCO2, Venous: 44 mmHg    pO2, Venous: 23 mmHg    HCO3, Venous: 28 mmol/L    Base Excess, Venous: 2.8 mmol/L    Oxygen Saturation, Venous: 32.3 %    Total CO2, Venous: 29.3 mmol/L    Blood Gas Venous - Lactate: 1.4 mmol/L (22 @ 15:06)    Urinalysis Basic - ( 07 May 2022 15:05 )    Color: Yellow / Appearance: Clear / S.029 / pH: x  Gluc: x / Ketone: Negative  / Bili: Negative / Urobili: 6 mg/dL   Blood: x / Protein: 100 mg/dL / Nitrite: Negative   Leuk Esterase: Negative / RBC: 6 /HPF / WBC 4 /HPF   Sq Epi: x / Non Sq Epi: 2 /HPF / Bacteria: Negative    Micro:    RADIOLOGY & ADDITIONAL TESTS:    EKG:  Personally reviewed, NSR, no ST [place] abnormalities. QTc wnl.     Xray:  < from: Xray Chest 1 View- PORTABLE-Urgent (22 @ 18:14) >    IMPRESSION:  Left lower lobe pneumonia.    US/CT/MRI:  < from: CT Abdomen and Pelvis w/ IV Cont (22 @ 17:23) >    LOWER CHEST: Solid and groundglass opacities are noted within the   visualized left upper lobe. There is bibasilar subsegmental atelectasis.    LIVER: Within normal limits.  BILE DUCTS: Normal caliber.  GALLBLADDER: Within normal limits.  SPLEEN: Within normal limits.  PANCREAS: Within normal limits.  ADRENALS: Within normal limits.  KIDNEYS/URETERS: Within normal limits.    BLADDER: Within normal limits.  REPRODUCTIVE ORGANS: Within normal limits.    BOWEL: No bowel obstruction. The appendix is normal.  PERITONEUM: No ascites.  VESSELS:  Within normal limits.  RETROPERITONEUM/LYMPH NODES: No lymphadenopathy.  ABDOMINAL WALL: Within normal limits.  BONES: Within normal limits.    IMPRESSION: Solid and groundglass opacities are noted within the  visualized left upper lobe may represent pneumonia; follow-up chest CT is   recommended    < from: US Abdomen Upper Quadrant Right (22 @ 17:05) >  IMPRESSION:  No focal mass lesions, cholelithiasis, or intraductal biliary dilatation.    The gallbladder is contracted without evidence of acute cholecystitis. Labs personally reviewed.                        11.3   15.54 )-----------( 544      ( 07 May 2022 15:05 )             34.5         137  |  101  |  14  ----------------------------<  124<H>  4.3   |  24  |  0.68    Ca    8.8      07 May 2022 15:05    TPro  8.4<H>  /  Alb  3.0<L>  /  TBili  0.4  /  DBili  x   /  AST  195<H>  /  ALT  206<H>  /  AlkPhos  301<H>      LIVER FUNCTIONS - ( 07 May 2022 15:05 )  Alb: 3.0 g/dL / Pro: 8.4 g/dL / ALK PHOS: 301 U/L / ALT: 206 U/L / AST: 195 U/L / GGT: x           Blood Gas Profile - Venous (22 @ 15:06)    pH, Venous: 7.41    pCO2, Venous: 44 mmHg    pO2, Venous: 23 mmHg    HCO3, Venous: 28 mmol/L    Base Excess, Venous: 2.8 mmol/L    Oxygen Saturation, Venous: 32.3 %    Total CO2, Venous: 29.3 mmol/L    Blood Gas Venous - Lactate: 1.4 mmol/L (22 @ 15:06)    Urinalysis Basic - ( 07 May 2022 15:05 )  Color: Yellow / Appearance: Clear / S.029 / pH: x  Gluc: x / Ketone: Negative  / Bili: Negative / Urobili: 6 mg/dL   Blood: x / Protein: 100 mg/dL / Nitrite: Negative   Leuk Esterase: Negative / RBC: 6 /HPF / WBC 4 /HPF   Sq Epi: x / Non Sq Epi: 2 /HPF / Bacteria: Negative    EKG personally reviewed.   NSR at 100 bpm, no acute ischemic changes, QTc 436 ms.     Imaging personally reviewed.  Xray Chest 1 View- PORTABLE-Urgent (22 @ 18:14)     IMPRESSION:  Left lower lobe pneumonia.    US/CT/MRI:  < from: CT Abdomen and Pelvis w/ IV Cont (22 @ 17:23) >    LOWER CHEST: Solid and groundglass opacities are noted within the   visualized left upper lobe. There is bibasilar subsegmental atelectasis.    LIVER: Within normal limits.  BILE DUCTS: Normal caliber.  GALLBLADDER: Within normal limits.  SPLEEN: Within normal limits.  PANCREAS: Within normal limits.  ADRENALS: Within normal limits.  KIDNEYS/URETERS: Within normal limits.    BLADDER: Within normal limits.  REPRODUCTIVE ORGANS: Within normal limits.    BOWEL: No bowel obstruction. The appendix is normal.  PERITONEUM: No ascites.  VESSELS:  Within normal limits.  RETROPERITONEUM/LYMPH NODES: No lymphadenopathy.  ABDOMINAL WALL: Within normal limits.  BONES: Within normal limits.    IMPRESSION: Solid and groundglass opacities are noted within the  visualized left upper lobe may represent pneumonia; follow-up chest CT is   recommended    < from: US Abdomen Upper Quadrant Right (22 @ 17:05) >  IMPRESSION:  No focal mass lesions, cholelithiasis, or intraductal biliary dilatation.    The gallbladder is contracted without evidence of acute cholecystitis.

## 2022-05-08 NOTE — PROGRESS NOTE ADULT - PROBLEM SELECTOR PLAN 1
Likely 2/2 Bacterial PNA, WBC 15.4, 80% PMNs, , T 100.8. Lactate 1.4  S/p 1L NS in ER  - additional 1L LR given on admission.   - Tx for PNA per below. 3 weeks of fever, reported weight loss acutely, weakness, chest pain.   - CXR w evidence of LLL PNA. Abdominal CT Left PNA.   - F/U CT chest, f/u    - Ordered quant r/o TB, sputum Cx, Sputum AFB   - C/W Azithromax and Ceftriaxone  - F/U BCx, UCx, Urine strep, Legionella

## 2022-05-08 NOTE — PROGRESS NOTE ADULT - PROBLEM SELECTOR PLAN 2
3 weeks of fever, reported weight loss acutely, weakness, chest pain.   CXR w evidence of LLL PNA  Abdominal CT Left PNA.  - Sepsis tx per above   - Ordered CT chest, f/u    - Ordered quant r/o TB  - Ordered sputum Cx, Sputum AFB   - Abx: CTX Azithro for 5-7d   - urine strep, legionella  - f/u BCx from ED  - F/u UCx, low suspicion since clear UA and no urinary symptoms. Likely 2/2 Bacterial PNA, WBC 15.4, 80% PMNs, , T 100.8. Lactate 1.4  S/p 1L NS in ER  - additional 1L LR given on admission.   - Tx for PNA as above  - Daily fever curves and CBC  - F/U BCx and UCx

## 2022-05-08 NOTE — H&P ADULT - PROBLEM SELECTOR PLAN 9
DVT PPx: Lovenox qd  Diet: Regular for now    Dispo: Abx treatment, medical workup, needs a PCP on DC.      FULL CODE

## 2022-05-08 NOTE — PATIENT PROFILE ADULT - FALL HARM RISK - HARM RISK INTERVENTIONS

## 2022-05-08 NOTE — H&P ADULT - PROBLEM SELECTOR PLAN 2
3 weeks of fever, reported weight loss acutely, weakness, chest pain.   CXR w evidence of LLL PNA  Abdominal CT Left PNA.  - Sepsis tx per above   - ? consider CT chest   - ? Send quant r/o TB  - ?abx CTX Azithro for 5-7d   - urine strep, legionella  - f/u BCx from ED  - F/u UCx, low suspicion since clear UA and no urinary symptoms. 3 weeks of fever, reported weight loss acutely, weakness, chest pain.   CXR w evidence of LLL PNA  Abdominal CT Left PNA.  - Sepsis tx per above   - Ordered CT chest, f/u    - Ordered quant r/o TB  - Ordered sputum Cx, Sputum AFB   - Abx: CTX Azithro for 5-7d   - urine strep, legionella  - f/u BCx from ED  - F/u UCx, low suspicion since clear UA and no urinary symptoms. Pt with 3 weeks of fevers/chills, night sweats, weight loss, and L-sided chest pain/LUQ abdominal pain.   3 weeks of fever, reported weight loss acutely, weakness, chest pain.   CXR w evidence of LLL PNA  Abdominal CT Left PNA.  - Sepsis tx per above   - Ordered CT chest, f/u    - Ordered quant r/o TB  - Ordered sputum Cx, Sputum AFB   - Abx: CTX Azithro for 5-7d   - urine strep, legionella  - f/u BCx from ED  - F/u UCx, low suspicion since clear UA and no urinary symptoms. Pt with 3 weeks of fevers/chills, night sweats, weight loss, and L-sided chest pain/LUQ abdominal pain. CXR showing LLL PNA and CTAP with solid and groundglass opacities are noted within the visualized AC.  - F/u CT chest noncontrast to better evaluate for PNA  - C/w ceftriaxone and azithromycin for now  - Check urine legionella  - F/u blood cxs   - Pt also with night sweats and unintentional weight loss, concerning for pulm TB, as pt is also from [place]  - Ordered Quant Gold   - Ordered sputum Cx and sputum AFB to r/o pulm TB  - Airborne precautions  - Pain control with PO Tylenol PRN  - Supplemental O2 PRN  - ID consult to be called in AM

## 2022-05-08 NOTE — PROGRESS NOTE ADULT - PROBLEM SELECTOR PLAN 3
AST/ALT on arrival 195/206  Alcohol vs. Substance induced hepatitis   - Abd US showed liver wnl, contracted GB no evidence of cholecystitis.   - not on any medications, does use "poppy seed flower, ground up daily" as well as daily glass of wine and 2 shots [place]  - Trend CMP daily   - Coags in AM to r/o synthetic liver dysfunction  - f/u Hepatitis panel AST/ALT on arrival 195/206  Alcohol vs. Substance induced hepatitis   - Abd US showed liver wnl, contracted GB no evidence of cholecystitis.   - not on any medications, does use "poppy seed flower, ground up daily" as well as daily glass of wine and 2 shots [place]  - Trend CMP daily   - F/U LFTs  - PT/INR mildly elevated

## 2022-05-08 NOTE — H&P ADULT - PROBLEM SELECTOR PLAN 1
Likely 2/2 Bacterial PNA, WBC 15.4, 80% PMNs, , T 100.8. Lactate 1.4  S/p 1L NS in ER  - additional 1L LR given on admission.   - Tx for PNA per below. Pt met SIRS criteria with T 100.8F, HR > 90, WBC 15.4. Likely from bacterial PNA but also ruling out pulm TB given night sweats and unintentional weight loss.  - Plan as below for LLL PNA  - S/p 1L NS in ER. Additional 1L LR given on admission.   - Lactate 4 on admission, repeat lactate in AM.

## 2022-05-08 NOTE — H&P ADULT - PROBLEM SELECTOR PLAN 5
No clinical evidence of CHF, no LE swelling JVD, SOB.   Worse in tricuspid region, also in mitral region. 4/6 on exam.   DDx includes mitral regurgitation, tricuspid regurgitation, or hypovolemia from sepsis.  Need to r/o significant valvular disease, patient immigrant from [place] 12y ago ?rheumatic heart disease. Need to r/o endocarditis, unknown if bacteremia present, denies IV drug abuse.   - TTE ordered, F/u Drinks 1 glass of wine and 2 shots of [place] daily. No hx of withdrawal syndromes.  - Start low-risk symptom-triggered CIWA   - SBIRT consult  - Discuss alcohol cessation prior to DC

## 2022-05-08 NOTE — PROGRESS NOTE ADULT - SUBJECTIVE AND OBJECTIVE BOX
Vipin Gregory PGY-1  Pager: NS) [phone]/ [phone]  Patient is a 51y old  Male who presents with a chief complaint of Fevers, Chest/Abdominal Pain (08 May 2022 00:13)      SUBJECTIVE / OVERNIGHT EVENTS:  No acute events over night. Denies any fevers/chills, headache, CP, SOB, abd pain, N/V/D, constipation, or leg swelling.       MEDICATIONS  (STANDING):  azithromycin   Tablet 500 milliGRAM(s) Oral daily  cefTRIAXone   IVPB 1000 milliGRAM(s) IV Intermittent every 24 hours  enoxaparin Injectable 40 milliGRAM(s) SubCutaneous every 24 hours    MEDICATIONS  (PRN):  LORazepam   Injectable 2 milliGRAM(s) IV Push every 1 hour PRN Symptom-triggered: each CIWA -Ar score 8 or GREATER  melatonin 3 milliGRAM(s) Oral at bedtime PRN Insomnia          OBJECTIVE:  Vital Signs Last 24 Hrs  T(C): 36.7 (08 May 2022 06:28), Max: 38.1 (07 May 2022 14:49)  T(F): 98 (08 May 2022 06:28), Max: 100.6 (07 May 2022 14:49)  HR: 98 (08 May 2022 06:28) (95 - 113)  BP: 170/96 (08 May 2022 06:28) (137/96 - 170/96)  BP(mean): --  RR: 18 (08 May 2022 06:28) (15 - 20)  SpO2: 100% (08 May 2022 06:28) (98% - 100%)  PHYSICAL EXAM:  GENERAL: NAD, well-developed  HEAD:  Atraumatic, Normocephalic  EYES: conjunctiva and sclera clear  NECK: Supple, No JVD  CHEST/LUNG: Clear to auscultation bilaterally; No wheeze  HEART: Regular rate and rhythm; No murmurs, rubs, or gallops  ABDOMEN: Soft, Nontender, Nondistended; Bowel sounds present  EXTREMITIES:  No clubbing, cyanosis, or edema  PSYCH: AAOx3  NEUROLOGY: non-focal  SKIN: No rashes or lesions    CAPILLARY BLOOD GLUCOSE        I&O's Summary            LABS:                        11.3   15.54 )-----------( 544      ( 07 May 2022 15:05 )             34.5     05-07    137  |  101  |  14  ----------------------------<  124<H>  4.3   |  24  |  0.68    Ca    8.8      07 May 2022 15:05    TPro  8.4<H>  /  Alb  3.0<L>  /  TBili  0.4  /  DBili  x   /  AST  195<H>  /  ALT  206<H>  /  AlkPhos  301<H>            Urinalysis Basic - ( 07 May 2022 15:05 )    Color: Yellow / Appearance: Clear / S.029 / pH: x  Gluc: x / Ketone: Negative  / Bili: Negative / Urobili: 6 mg/dL   Blood: x / Protein: 100 mg/dL / Nitrite: Negative   Leuk Esterase: Negative / RBC: 6 /HPF / WBC 4 /HPF   Sq Epi: x / Non Sq Epi: 2 /HPF / Bacteria: Negative            RADIOLOGY & ADDITIONAL TESTS:       Vipin Gregory PGY-1  Pager: NS) [phone]/ (LUQ) [phone]  Patient is a 51y old  Male who presents with a chief complaint of Fevers, Chest/Abdominal Pain (08 May 2022 00:13)      SUBJECTIVE / OVERNIGHT EVENTS:  -No acute events over night.   -Assessed at bedside. Denies any fevers/chills, headache, SOB, abd pain, N/V/D, constipation, or leg swelling.  -Tolerating PO.  -Endorsing UoP. No BMs overnight.       MEDICATIONS  (STANDING):  azithromycin   Tablet 500 milliGRAM(s) Oral daily  cefTRIAXone   IVPB 1000 milliGRAM(s) IV Intermittent every 24 hours  enoxaparin Injectable 40 milliGRAM(s) SubCutaneous every 24 hours    MEDICATIONS  (PRN):  LORazepam   Injectable 2 milliGRAM(s) IV Push every 1 hour PRN Symptom-triggered: each CIWA -Ar score 8 or GREATER  melatonin 3 milliGRAM(s) Oral at bedtime PRN Insomnia    OBJECTIVE:  Vital Signs Last 24 Hrs  T(C): 36.7 (08 May 2022 06:28), Max: 38.1 (07 May 2022 14:49)  T(F): 98 (08 May 2022 06:28), Max: 100.6 (07 May 2022 14:49)  HR: 98 (08 May 2022 06:28) (95 - 113)  BP: 170/96 (08 May 2022 06:28) (137/96 - 170/96)  BP(mean): --  RR: 18 (08 May 2022 06:28) (15 - 20)  SpO2: 100% (08 May 2022 06:28) (98% - 100%)    PHYSICAL EXAM:  GENERAL: NAD, well-developed  HEAD:  Atraumatic, Normocephalic  EYES: conjunctiva and sclera clear  NECK: Supple, No JVD  CHEST/LUNG: Decreased breath sounds on the left. No overt crackles on either side, but exam limited as pt not taking deep breaths.   HEART:  Holosystolic murmur heard @left parasternal border between 3rd, 4th intercostal space. No rubs or gallops. No s3 or S4.  ABDOMEN: Soft, Nontender, Nondistended; Bowel sounds present  EXTREMITIES:  No clubbing, cyanosis, or edema  PSYCH: AAOx3  NEUROLOGY: non-focal  SKIN: No rashes or lesions    CAPILLARY BLOOD GLUCOSE        I&O's Summary            LABS:                        11.3   15.54 )-----------( 544      ( 07 May 2022 15:05 )             34.5     05-07    137  |  101  |  14  ----------------------------<  124<H>  4.3   |  24  |  0.68    Ca    8.8      07 May 2022 15:05    TPro  8.4<H>  /  Alb  3.0<L>  /  TBili  0.4  /  DBili  x   /  AST  195<H>  /  ALT  206<H>  /  AlkPhos  301<H>  05-07          Urinalysis Basic - ( 07 May 2022 15:05 )    Color: Yellow / Appearance: Clear / S.029 / pH: x  Gluc: x / Ketone: Negative  / Bili: Negative / Urobili: 6 mg/dL   Blood: x / Protein: 100 mg/dL / Nitrite: Negative   Leuk Esterase: Negative / RBC: 6 /HPF / WBC 4 /HPF   Sq Epi: x / Non Sq Epi: 2 /HPF / Bacteria: Negative            RADIOLOGY & ADDITIONAL TESTS:

## 2022-05-08 NOTE — PROGRESS NOTE ADULT - PROBLEM SELECTOR PLAN 5
Spoke with patient regarding results and she verbalized understanding.    No clinical evidence of CHF, no LE swelling JVD, SOB.   Worse in tricuspid region, also in mitral region. 4/6 on exam.   DDx includes mitral regurgitation, tricuspid regurgitation, or hypovolemia from sepsis.  Need to r/o significant valvular disease, patient immigrant from [place] 12y ago ?rheumatic heart disease. Need to r/o endocarditis, unknown if bacteremia present, denies IV drug abuse.   - TTE ordered, F/u DDx includes mitral regurgitation, tricuspid regurgitation, or hypovolemia from sepsis.  -Need to r/o significant valvular disease, patient immigrant from [place] 12y ago ?rheumatic heart disease.    -No clinical evidence of CHF, no LE swelling JVD, SOB.  -No hx of IVDU  -F/U BCx  -Does NOT currently meet [facility] criteria   -TTE ordered to r/u endocarditis

## 2022-05-08 NOTE — PROGRESS NOTE ADULT - PROBLEM SELECTOR PLAN 4
1 Glass wine, 2 shots [place] qd  No hx of withdrawal symptoms, no seizures, shaking.   - Low risk CIWA symptoms triggered protocol   - Discuss cessation prior to DC

## 2022-05-08 NOTE — H&P ADULT - PROBLEM SELECTOR PLAN 7
140s-150s systolic during admission.  Patient with no medical care outpatient, unknown baseline given this is acute episode of illness.  Potentially pain induced, likely underlying Dx of essential hypertension.  - Monitor overnight  - consider start on BP regimen if persistently elevated, and consider DC on BP medications   - needs PCP f/u, needs actual PCP as does not have. 140s-150s systolic during admission.  Patient with no medical care outpatient, unknown baseline given this is acute episode of illness.  Potentially pain induced, likely underlying Dx of essential hypertension.  - Monitor overnight  - consider start on BP regimen if persistently elevated, depending on if A1C would consider ACE/ARB, and consider DC on BP medications   - needs PCP f/u, needs actual PCP as does not have. 140s-150s systolic BPs during admission. Pt with no medical care outpatient, unknown baseline given this is acute episode of illness. Potentially pain induced, likely underlying Dx of essential hypertension.  - Monitor overnight  - Consider starting on BP regimen if persistently elevated

## 2022-05-08 NOTE — PROGRESS NOTE ADULT - PROBLEM SELECTOR PLAN 6
Reproducible on exam, Left sided, 7/12, nonradiated, intermittent lasts up to 1h. Says rubbing improves pain  Trop negative, nonanginal CP (location, but not stress induced and not improve with rest).   likely MSK pain vs. PNA associated pain  - statin therapy if needed depending on ASCVD and A1C risk factors   - outpatient f/u w PCP Likely 2/2 pleurisy iso PNA vs MSK. Unlikely to be Cardiac given no findings on ECG and negative troponins  - statin therapy if needed depending on ASCVD and A1C risk factors   - outpatient f/u w PCP

## 2022-05-09 VITALS
TEMPERATURE: 99 F | RESPIRATION RATE: 18 BRPM | DIASTOLIC BLOOD PRESSURE: 95 MMHG | SYSTOLIC BLOOD PRESSURE: 146 MMHG | OXYGEN SATURATION: 100 % | HEART RATE: 97 BPM

## 2022-05-09 LAB
ALBUMIN SERPL ELPH-MCNC: 2.5 G/DL — LOW (ref 3.3–5)
ALP SERPL-CCNC: 225 U/L — HIGH (ref 40–120)
ALT FLD-CCNC: 212 U/L — HIGH (ref 4–41)
ANION GAP SERPL CALC-SCNC: 11 MMOL/L — SIGNIFICANT CHANGE UP (ref 7–14)
AST SERPL-CCNC: 141 U/L — HIGH (ref 4–40)
BASOPHILS # BLD AUTO: 0.04 K/UL — SIGNIFICANT CHANGE UP (ref 0–0.2)
BASOPHILS NFR BLD AUTO: 0.3 % — SIGNIFICANT CHANGE UP (ref 0–2)
BILIRUB SERPL-MCNC: 0.3 MG/DL — SIGNIFICANT CHANGE UP (ref 0.2–1.2)
BUN SERPL-MCNC: 9 MG/DL — SIGNIFICANT CHANGE UP (ref 7–23)
CALCIUM SERPL-MCNC: 8.7 MG/DL — SIGNIFICANT CHANGE UP (ref 8.4–10.5)
CHLORIDE SERPL-SCNC: 105 MMOL/L — SIGNIFICANT CHANGE UP (ref 98–107)
CO2 SERPL-SCNC: 21 MMOL/L — LOW (ref 22–31)
CREAT SERPL-MCNC: 0.69 MG/DL — SIGNIFICANT CHANGE UP (ref 0.5–1.3)
EGFR: 111 ML/MIN/1.73M2 — SIGNIFICANT CHANGE UP
EOSINOPHIL # BLD AUTO: 0.09 K/UL — SIGNIFICANT CHANGE UP (ref 0–0.5)
EOSINOPHIL NFR BLD AUTO: 0.7 % — SIGNIFICANT CHANGE UP (ref 0–6)
GLUCOSE SERPL-MCNC: 100 MG/DL — HIGH (ref 70–99)
HCT VFR BLD CALC: 31.7 % — LOW (ref 39–50)
HGB BLD-MCNC: 10.1 G/DL — LOW (ref 13–17)
HIV 1+2 AB+HIV1 P24 AG SERPL QL IA: SIGNIFICANT CHANGE UP
IANC: 8.9 K/UL — HIGH (ref 1.8–7.4)
IMM GRANULOCYTES NFR BLD AUTO: 0.6 % — SIGNIFICANT CHANGE UP (ref 0–1.5)
LYMPHOCYTES # BLD AUTO: 17.7 % — SIGNIFICANT CHANGE UP (ref 13–44)
LYMPHOCYTES # BLD AUTO: 2.18 K/UL — SIGNIFICANT CHANGE UP (ref 1–3.3)
MAGNESIUM SERPL-MCNC: 2.2 MG/DL — SIGNIFICANT CHANGE UP (ref 1.6–2.6)
MCHC RBC-ENTMCNC: 29.4 PG — SIGNIFICANT CHANGE UP (ref 27–34)
MCHC RBC-ENTMCNC: 31.9 GM/DL — LOW (ref 32–36)
MCV RBC AUTO: 92.2 FL — SIGNIFICANT CHANGE UP (ref 80–100)
MONOCYTES # BLD AUTO: 1.02 K/UL — HIGH (ref 0–0.9)
MONOCYTES NFR BLD AUTO: 8.3 % — SIGNIFICANT CHANGE UP (ref 2–14)
NEUTROPHILS # BLD AUTO: 8.9 K/UL — HIGH (ref 1.8–7.4)
NEUTROPHILS NFR BLD AUTO: 72.4 % — SIGNIFICANT CHANGE UP (ref 43–77)
NRBC # BLD: 0 /100 WBCS — SIGNIFICANT CHANGE UP
NRBC # FLD: 0 K/UL — SIGNIFICANT CHANGE UP
PHOSPHATE SERPL-MCNC: 3.7 MG/DL — SIGNIFICANT CHANGE UP (ref 2.5–4.5)
PLATELET # BLD AUTO: 512 K/UL — HIGH (ref 150–400)
POTASSIUM SERPL-MCNC: 4.2 MMOL/L — SIGNIFICANT CHANGE UP (ref 3.5–5.3)
POTASSIUM SERPL-SCNC: 4.2 MMOL/L — SIGNIFICANT CHANGE UP (ref 3.5–5.3)
PROT SERPL-MCNC: 7.3 G/DL — SIGNIFICANT CHANGE UP (ref 6–8.3)
RBC # BLD: 3.44 M/UL — LOW (ref 4.2–5.8)
RBC # FLD: 13.2 % — SIGNIFICANT CHANGE UP (ref 10.3–14.5)
SODIUM SERPL-SCNC: 137 MMOL/L — SIGNIFICANT CHANGE UP (ref 135–145)
WBC # BLD: 12.3 K/UL — HIGH (ref 3.8–10.5)
WBC # FLD AUTO: 12.3 K/UL — HIGH (ref 3.8–10.5)

## 2022-05-09 PROCEDURE — 99239 HOSP IP/OBS DSCHRG MGMT >30: CPT | Mod: GC

## 2022-05-09 RX ORDER — LISINOPRIL 2.5 MG/1
1 TABLET ORAL
Qty: 30 | Refills: 0
Start: 2022-05-09

## 2022-05-09 RX ADMIN — AZITHROMYCIN 255 MILLIGRAM(S): 500 TABLET, FILM COATED ORAL at 09:29

## 2022-05-09 RX ADMIN — CEFTRIAXONE 100 MILLIGRAM(S): 500 INJECTION, POWDER, FOR SOLUTION INTRAMUSCULAR; INTRAVENOUS at 07:31

## 2022-05-09 NOTE — PROGRESS NOTE ADULT - PROBLEM SELECTOR PLAN 4
1 Glass wine, 2 shots [place] qd  No hx of withdrawal symptoms, no seizures, shaking.   - Low risk CIWA symptoms triggered protocol   - Discuss cessation prior to DC No hx of withdrawal symptoms, no seizures, shaking.   - Low risk CIWA symptoms triggered protocol   - Discuss cessation prior to DC  -Did NOT require ativan during admission- CIWAs of 0

## 2022-05-09 NOTE — DISCHARGE NOTE PROVIDER - YES NO FOR MLM POSITIVE OR NEGATIVE COVID RESULT
Acct: [de-identified]  

## 2022-05-09 NOTE — DISCHARGE NOTE NURSING/CASE MANAGEMENT/SOCIAL WORK - NSDCPEFALRISK_GEN_ALL_CORE
For information on Fall & Injury Prevention, visit: [email or web address] OR  [email or web address] OR  https://www.cdc.gov/steadi/patient.html

## 2022-05-09 NOTE — DISCHARGE NOTE PROVIDER - CARE PROVIDER_API CALL
Lavinia Rosen)  Internal Medicine  [address], [address]  [place] [address]  Phone: [phone]  Fax: [phone]  Follow Up Time: 2 weeks   Nikole Alanis  [address] [place] [address]  Phone: [phone]  Fax: (   )    -  Follow Up Time: 2 weeks

## 2022-05-09 NOTE — DISCHARGE NOTE PROVIDER - HOSPITAL COURSE
52 yo man with no known PMH and no medical followup but with daily alcohol use (1 glass of wine, 2 shots of [place]), immigrated from [place] 12y ago, presenting for 3 weeks of fevers/chills, night sweats, unintentional weight loss possibly 2/2 LLL PNA, will r/o pulm TB. Patient also with chest pain, belly pain, and anemia. Troponins were within normal limit. Echo was done for holosystolic murmur noticed on PE. ECHO is negative for valvulopathy. Chest pain is likely 2/2 left sided pneumonia. Patient was found to have Left upper PNA on chest x-ray and seen on CT A/P which was performed to evaluate for GI pathology. Given that patient had never seen a physician, a quantgold and CT chest was ordered was well. Patient was treated with azithromycin and ceftriaxone and transitioned to PO Augmentin.  Patient was monitored on CIWA for 72 hours where he did not require any ativan and had CIWAs of 0. Patient noted to have elevated LFTs which maybe 2/2 to alcohol use? Patient to follow-up with PMD. Additionally, patient to establish care with PCP and follow-up for management of prediabetes (A1C of 5.8) and Hypertension.     At this time, patient is medically optimized for discharge with outpatient follow-up and establish care with PMD. Patient encouraged to continue taking antibiotics as prescribed. Patient will be called and informed via phone for result of quant-gold    52 yo man with no known PMH and no medical followup but with daily alcohol use (1 glass of wine, 2 shots of [place]), immigrated from [place] 12y ago, presenting for 3 weeks of fevers/chills, night sweats, unintentional weight loss possibly 2/2 LLL PNA. Patient also with chest pain and belly pain. Troponins were within normal limit. Echo was done for holosystolic murmur noticed on PE. ECHO is negative for valvulopathy. Chest pain is likely 2/2 left sided pneumonia. Patient was found to have Left upper PNA on chest x-ray and seen on CT A/P which was performed on initial admission to evaluate for GI/Renal pathology. Given that patient had never seen a physician, a quantgold was ordered was well. Patient was treated with azithromycin and ceftriaxone and transitioned to PO Augmentin.  Patient was monitored on CIWA for 72 hours where he did not require any ativan and had CIWAs of 0. Patient noted to have elevated LFTs which maybe 2/2 to alcohol use? Iron studies were consistent with Anemia of Chronic Disease. Patient to follow-up with PMD. Additionally, patient to establish care with PCP and follow-up for management of prediabetes (A1C of 5.8) and Hypertension.     At this time, patient is medically optimized for discharge with outpatient follow-up and establish care with PMD. Patient encouraged to continue taking antibiotics as prescribed. Patient will be called and informed via phone for result of quant-gold.   50 yo man with no known PMH and no medical followup but with daily alcohol use (1 glass of wine, 2 shots of [place]), immigrated from [place] 12y ago, presenting for 3 weeks of fevers/chills, night sweats, unintentional weight loss possibly 2/2 LLL PNA. Patient also with chest pain and belly pain. Troponins were within normal limit. Echo was done for holosystolic murmur noticed on PE. ECHO is negative for valvulopathy. Chest pain is likely 2/2 left sided pneumonia. Patient was found to have Left upper PNA on chest x-ray and seen on CT A/P which was performed on initial admission to evaluate for GI/Renal pathology. Given that patient had never seen a physician, a quantgold was ordered was well. Patient was treated with azithromycin and ceftriaxone and transitioned to PO Augmentin.  Patient was monitored on CIWA for 72 hours where he did not require any ativan and had CIWAs of 0. Patient noted to have elevated LFTs which maybe 2/2 to alcohol use? Iron studies were consistent with Anemia of Chronic Disease. Patient to follow-up with PMD. Additionally, patient to establish care with PCP and follow-up for management of prediabetes (A1C of 5.8) and Hypertension.     At this time, patient is medically optimized for discharge with outpatient follow-up and establish care with PMD. Patient encouraged to continue taking antibiotics as prescribed. Patient will be called and informed via phone for result of quant-gold if positive.    52 yo man with no known PMH and no medical followup but with daily alcohol use (1 glass of wine, 2 shots of [place]), immigrated from [place] 12y ago, presenting for 3 weeks of fevers/chills, night sweats, unintentional weight loss possibly 2/2 LLL PNA. Patient also with chest pain and belly pain. ECG and Troponins were within normal limit. Echo was done for holosystolic murmur noticed on PE. ECHO is negative for valvulopathy. Chest pain is likely 2/2 left sided pneumonia. Patient was found to have Left upper PNA on chest x-ray and seen on CT A/P which was performed on initial admission to evaluate for GI/Renal pathology. Given that patient had never seen a physician [place] the [place], a quantgold was ordered was well. Patient was treated with azithromycin and ceftriaxone and transitioned to PO Augmentin.  Patient was monitored on CIWA for 72 hours where he did not require any ativan and had CIWAs of 0. Patient noted to have elevated LFTs which maybe 2/2 to alcohol use? Iron studies were consistent with Anemia of Chronic Disease. Patient to follow-up with PMD. Additionally, patient to establish care with PCP and follow-up for management of prediabetes (A1C of 5.8) and Hypertension.     At this time, patient is medically optimized for discharge with outpatient follow-up and establish care with PMD. Patient encouraged to continue taking antibiotics as prescribed. Patient will be called and informed via phone for result of quant-gold if positive.

## 2022-05-09 NOTE — DISCHARGE NOTE PROVIDER - NSDCFUADDAPPT_GEN_ALL_CORE_FT
[facility]  [address] [place] [id number]  [phone] [facility]  [address] [place] [address] [id number] [phone] [facility]  [address] [facility] [address]  [phone]

## 2022-05-09 NOTE — PROGRESS NOTE ADULT - ASSESSMENT
51yom with unknown PMH presents with 3 weeks of fevers a/w acute non-anginal chest pain, weakness, and weight loss admitted for treatment of sepsis 2/2 left lower lobe pneumonia, on CTX and Azithromycin, pending chest CT. Also on CIWA moderate alcohol use disorder. Pending TTE for holosystolic murmur.        51yom with unknown PMH presents with 3 weeks of fevers a/w acute non-anginal chest pain, weakness, and weight loss admitted for treatment of sepsis 2/2 left lower lobe pneumonia, on CTX and Azithromycin, pending chest CT. Also on CIWA moderate alcohol use disorder.

## 2022-05-09 NOTE — DISCHARGE NOTE PROVIDER - PROVIDER TOKENS
PROVIDER:[TOKEN:[[id number]],FOLLOWUP:[2 weeks]] FREE:[LAST:[Annabelle],FIRST:[Nikole],PHONE:[phone]],FAX:[(   )    -],ADDRESS:[[address] [place] [address]],FOLLOWUP:[2 weeks]]

## 2022-05-09 NOTE — PROGRESS NOTE ADULT - PROBLEM SELECTOR PLAN 2
Likely 2/2 Bacterial PNA, WBC 15.4, 80% PMNs, , T 100.8. Lactate 1.4  S/p 1L NS in ER  - additional 1L LR given on admission.   - Tx for PNA as above  - Daily fever curves and CBC  - F/U BCx and UCx RESOLVED  Likely 2/2 Bacterial PNA, WBC 15.4, 80% PMNs, , T 100.8. Lactate 1.4  S/p 1L NS in ER  - additional 1L LR given on admission.   - Tx for PNA as above  - Daily fever curves and CBC  - BCx is negative

## 2022-05-09 NOTE — DISCHARGE NOTE PROVIDER - NSDCCPCAREPLAN_GEN_ALL_CORE_FT
PRINCIPAL DISCHARGE DIAGNOSIS  Diagnosis: Pneumonia  Assessment and Plan of Treatment: You came to the hospital with fevers, cough, and were found to have pneumonia on chest x-ray and CAT scan. You were treated with IV antibiotics and switched to oral for discharge. Please continue taking these antibiotics as prescribed. Additionally, since you have never seen a doctor [place] the [place], your quantiferon gold which is a test for tuberculosis was sent. You had a CT scan of chest done which showed______ .You will be called by the result of it on your phone.      SECONDARY DISCHARGE DIAGNOSES  Diagnosis: Healthcare maintenance  Assessment and Plan of Treatment: As mentioned by you, you have not seen a doctor or established care with a primary medical doctor since moving to the [facility]. Please make an appointment and follow-up with a doctor with the information provided in this discharge note.    Diagnosis: Transaminitis  Assessment and Plan of Treatment: Your liver numbers were found to be high. We tested hepatitis panels which were negative. Your liver numbers were most likely high from your alcohol use. Please follow-up with your primary doctor regarding this issue.    Diagnosis: Hypertension  Assessment and Plan of Treatment: Your blood pressure was high in the hospital. You will be discharged on a blood pressure medication. Please take this medication and follow-up with your primary doctor.    Diagnosis: Holosystolic murmur  Assessment and Plan of Treatment: You had a murmur in your heart and some chest pain. Your ECG and bloodwork did not indicate any acute ischemia or heart attack. You had an echocardiogram done which did not show any acute findings. Please follow-up with your primary medical doctor regarding this issue.    Diagnosis: Prediabetes  Assessment and Plan of Treatment: your A1C was found to be high in the hospital. Please follow-up with your doctor in the clinic regarding this issue.     PRINCIPAL DISCHARGE DIAGNOSIS  Diagnosis: Pneumonia  Assessment and Plan of Treatment: You came to the hospital with fevers, cough, and were found to have pneumonia on chest x-ray and CAT scan. You were treated with IV antibiotics and switched to oral for discharge. Please continue taking these antibiotics as prescribed. Additionally, since you have never seen a doctor [place] the [place], your quantiferon gold which is a test for tuberculosis was sent.You will be called by the result of it on your phone.      SECONDARY DISCHARGE DIAGNOSES  Diagnosis: Healthcare maintenance  Assessment and Plan of Treatment: As mentioned by you, you have not seen a doctor or established care with a primary medical doctor since moving to the [facility]. Please make an appointment and follow-up with a doctor with the information provided in this discharge note.    Diagnosis: Transaminitis  Assessment and Plan of Treatment: Your liver numbers were found to be high. We tested hepatitis panels which were negative. Your liver numbers were most likely high from your alcohol use. Please follow-up with your primary doctor regarding this issue.    Diagnosis: Hypertension  Assessment and Plan of Treatment: Your blood pressure was high in the hospital. You will be discharged on a blood pressure medication. Please take this medication and follow-up with your primary doctor.    Diagnosis: Holosystolic murmur  Assessment and Plan of Treatment: You had a murmur in your heart and some chest pain. Your ECG and bloodwork did not indicate any acute ischemia or heart attack. You had an echocardiogram done which did not show any acute findings. Please follow-up with your primary medical doctor regarding this issue.    Diagnosis: Prediabetes  Assessment and Plan of Treatment: your A1C was found to be high in the hospital. Please follow-up with your doctor in the clinic regarding this issue.     PRINCIPAL DISCHARGE DIAGNOSIS  Diagnosis: Pneumonia  Assessment and Plan of Treatment: You came to the hospital with fevers, cough, and were found to have pneumonia on chest x-ray and CAT scan. You were treated with IV antibiotics and switched to oral for discharge. Please continue taking these antibiotics as prescribed. Additionally, since you have never seen a doctor [place] the [place], your quantiferon gold which is a test for tuberculosis was sent. If it is positive, you will be called by the result of it on your phone.      SECONDARY DISCHARGE DIAGNOSES  Diagnosis: Healthcare maintenance  Assessment and Plan of Treatment: As mentioned by you, you have not seen a doctor or established care with a primary medical doctor since moving to the [facility]. Please make an appointment and follow-up with a doctor with the information provided in this discharge note.    Diagnosis: Transaminitis  Assessment and Plan of Treatment: Your liver numbers were found to be high. We tested hepatitis panels which were negative. Your liver numbers were most likely high from your alcohol use. Please follow-up with your primary doctor regarding this issue.    Diagnosis: Hypertension  Assessment and Plan of Treatment: Your blood pressure was high in the hospital. You will be discharged on a blood pressure medication. Please take this medication and follow-up with your primary doctor.    Diagnosis: Holosystolic murmur  Assessment and Plan of Treatment: You had a murmur in your heart and some chest pain. Your ECG and bloodwork did not indicate any acute ischemia or heart attack. You had an echocardiogram done which did not show any acute findings. Please follow-up with your primary medical doctor regarding this issue.    Diagnosis: Prediabetes  Assessment and Plan of Treatment: your A1C was found to be high in the hospital. Please follow-up with your doctor in the clinic regarding this issue.

## 2022-05-09 NOTE — DISCHARGE NOTE PROVIDER - NSDCMRMEDTOKEN_GEN_ALL_CORE_FT
amoxicillin-clavulanate 875 mg-125 mg oral tablet: 1 tab(s) orally 2 times a day  lisinopril 10 mg oral tablet: 1 tab(s) orally once a day

## 2022-05-09 NOTE — DISCHARGE NOTE NURSING/CASE MANAGEMENT/SOCIAL WORK - PATIENT PORTAL LINK FT
You can access the FollowMyHealth Patient Portal offered by [facility] by registering at the following website: [email or web address]. By joining [employer]’s FollowMyHealth portal, you will also be able to view your health information using other applications (apps) compatible with our system.

## 2022-05-09 NOTE — PROGRESS NOTE ADULT - PROBLEM SELECTOR PLAN 3
AST/ALT on arrival 195/206  Alcohol vs. Substance induced hepatitis   - Abd US showed liver wnl, contracted GB no evidence of cholecystitis.   - not on any medications, does use "poppy seed flower, ground up daily" as well as daily glass of wine and 2 shots [place]  - Trend CMP daily   - F/U LFTs  - PT/INR mildly elevated AST/ALT on arrival 195/206  Alcohol vs. Substance induced hepatitis   - Abd US showed liver wnl, contracted GB no evidence of cholecystitis.   - Trend CMP daily   - PT/INR mildly elevated  -Outpatient follow-up for further workup

## 2022-05-09 NOTE — PROGRESS NOTE ADULT - SUBJECTIVE AND OBJECTIVE BOX
Vipin Gregory PGY-1  Pager: NS) [phone]/ (QJE) [phone]  Patient is a 51y old  Male who presents with a chief complaint of Fevers, Chest/Abdominal Pain (08 May 2022 07:25)      SUBJECTIVE / OVERNIGHT EVENTS:  -No acute events over night.   -Assessed at bedside. Denies any fevers/chills, headache, SOB, abd pain, N/V/D, constipation, or leg swelling.  -Tolerating PO.  -Endorsing UoP. No BMs overnight.     MEDICATIONS  (STANDING):  azithromycin  IVPB 500 milliGRAM(s) IV Intermittent every 24 hours  azithromycin  IVPB      cefTRIAXone   IVPB 1000 milliGRAM(s) IV Intermittent every 24 hours  enoxaparin Injectable 40 milliGRAM(s) SubCutaneous every 24 hours    MEDICATIONS  (PRN):  acetaminophen     Tablet .. 650 milliGRAM(s) Oral every 6 hours PRN Temp greater or equal to 38C (100.4F), Moderate Pain (4 - 6), Severe Pain (7 - 10)  LORazepam   Injectable 2 milliGRAM(s) IV Push every 1 hour PRN Symptom-triggered: each CIWA -Ar score 8 or GREATER  melatonin 3 milliGRAM(s) Oral at bedtime PRN Insomnia    OBJECTIVE:  Vital Signs Last 24 Hrs  T(C): 36.7 (09 May 2022 05:00), Max: 37.2 (08 May 2022 21:00)  T(F): 98 (09 May 2022 05:00), Max: 98.9 (08 May 2022 21:00)  HR: 92 (09 May 2022 05:00) (92 - 100)  BP: 160/98 (09 May 2022 05:00) (139/94 - 162/97)  BP(mean): --  RR: 18 (09 May 2022 05:00) (18 - 18)  SpO2: 100% (09 May 2022 05:00) (97% - 100%)    PHYSICAL EXAM:  GENERAL: NAD, well-developed  HEAD:  Atraumatic, Normocephalic  EYES: conjunctiva and sclera clear  NECK: Supple, No JVD  CHEST/LUNG: Decreased breath sounds on the left. No overt crackles on either side, but exam limited as pt not taking deep breaths.   HEART:  Holosystolic murmur heard @left parasternal border between 3rd, 4th intercostal space. No rubs or gallops. No s3 or S4.  ABDOMEN: Soft, Nontender, Nondistended; Bowel sounds present  EXTREMITIES:  No clubbing, cyanosis, or edema  PSYCH: AAOx3  NEUROLOGY: non-focal  SKIN: No rashes or lesions    CAPILLARY BLOOD GLUCOSE        I&O's Summary            LABS:                        10.7   14.46 )-----------( 507      ( 08 May 2022 08:30 )             32.1         137  |  103  |  13  ----------------------------<  102<H>  3.9   |  20<L>  |  0.66    Ca    8.4      08 May 2022 08:30    TPro  7.1  /  Alb  2.4<L>  /  TBili  0.4  /  DBili  x   /  AST  194<H>  /  ALT  212<H>  /  AlkPhos  264<H>      PT/INR - ( 08 May 2022 08:30 )   PT: 15.4 sec;   INR: 1.32 ratio         PTT - ( 08 May 2022 08:30 )  PTT:28.1 sec      Urinalysis Basic - ( 07 May 2022 15:05 )    Color: Yellow / Appearance: Clear / S.029 / pH: x  Gluc: x / Ketone: Negative  / Bili: Negative / Urobili: 6 mg/dL   Blood: x / Protein: 100 mg/dL / Nitrite: Negative   Leuk Esterase: Negative / RBC: 6 /HPF / WBC 4 /HPF   Sq Epi: x / Non Sq Epi: 2 /HPF / Bacteria: Negative          Culture - Blood (collected 07 May 2022 18:41)  Source: .Blood Blood-Peripheral  Preliminary Report (08 May 2022 19:02):    No growth to date.    Culture - Blood (collected 07 May 2022 18:41)  Source: .Blood Blood-Peripheral  Preliminary Report (08 May 2022 19:02):    No growth to date.    Culture - Urine (collected 07 May 2022 15:00)  Source: Clean Catch Clean Catch (Midstream)  Final Report (08 May 2022 13:41):    <10,000 CFU/mL Normal Urogenital Liseth        RADIOLOGY & ADDITIONAL TESTS:       Vipin Gregory PGY-1  Pager: NS) [phone]/ (RMI) [phone]  Patient is a 51y old  Male who presents with a chief complaint of Fevers, Chest/Abdominal Pain (08 May 2022 07:25)      SUBJECTIVE / OVERNIGHT EVENTS:  -No acute events over night.   -Assessed at bedside. Denies any fevers/chills, headache, SOB, abd pain, N/V/D, constipation, or leg swelling.  -Tolerating PO.  -Endorsing UoP. No BMs overnight.     MEDICATIONS  (STANDING):  azithromycin  IVPB 500 milliGRAM(s) IV Intermittent every 24 hours  azithromycin  IVPB      cefTRIAXone   IVPB 1000 milliGRAM(s) IV Intermittent every 24 hours  enoxaparin Injectable 40 milliGRAM(s) SubCutaneous every 24 hours    MEDICATIONS  (PRN):  acetaminophen     Tablet .. 650 milliGRAM(s) Oral every 6 hours PRN Temp greater or equal to 38C (100.4F), Moderate Pain (4 - 6), Severe Pain (7 - 10)  LORazepam   Injectable 2 milliGRAM(s) IV Push every 1 hour PRN Symptom-triggered: each CIWA -Ar score 8 or GREATER  melatonin 3 milliGRAM(s) Oral at bedtime PRN Insomnia    OBJECTIVE:  Vital Signs Last 24 Hrs  T(C): 36.7 (09 May 2022 05:00), Max: 37.2 (08 May 2022 21:00)  T(F): 98 (09 May 2022 05:00), Max: 98.9 (08 May 2022 21:00)  HR: 92 (09 May 2022 05:00) (92 - 100)  BP: 160/98 (09 May 2022 05:00) (139/94 - 162/97)  BP(mean): --  RR: 18 (09 May 2022 05:00) (18 - 18)  SpO2: 100% (09 May 2022 05:00) (97% - 100%)    PHYSICAL EXAM:  GENERAL: NAD, well-developed  HEAD:  Atraumatic, Normocephalic  EYES: conjunctiva and sclera clear  NECK: Supple, No JVD  CHEST/LUNG: Decreased breath sounds on the left. Mild bibasilar crackles.   HEART:  Holosystolic murmur heard @left parasternal border between 3rd, 4th intercostal space. No rubs or gallops. No s3 or S4.  ABDOMEN: Soft, Nontender, Nondistended; Bowel sounds present  EXTREMITIES:  No clubbing, cyanosis, or edema  PSYCH: AAOx3  NEUROLOGY: non-focal  SKIN: No rashes or lesions    CAPILLARY BLOOD GLUCOSE        I&O's Summary            LABS:                        10.7   14.46 )-----------( 507      ( 08 May 2022 08:30 )             32.1     -    137  |  103  |  13  ----------------------------<  102<H>  3.9   |  20<L>  |  0.66    Ca    8.4      08 May 2022 08:30    TPro  7.1  /  Alb  2.4<L>  /  TBili  0.4  /  DBili  x   /  AST  194<H>  /  ALT  212<H>  /  AlkPhos  264<H>  -    PT/INR - ( 08 May 2022 08:30 )   PT: 15.4 sec;   INR: 1.32 ratio         PTT - ( 08 May 2022 08:30 )  PTT:28.1 sec      Urinalysis Basic - ( 07 May 2022 15:05 )    Color: Yellow / Appearance: Clear / S.029 / pH: x  Gluc: x / Ketone: Negative  / Bili: Negative / Urobili: 6 mg/dL   Blood: x / Protein: 100 mg/dL / Nitrite: Negative   Leuk Esterase: Negative / RBC: 6 /HPF / WBC 4 /HPF   Sq Epi: x / Non Sq Epi: 2 /HPF / Bacteria: Negative          Culture - Blood (collected 07 May 2022 18:41)  Source: .Blood Blood-Peripheral  Preliminary Report (08 May 2022 19:02):    No growth to date.    Culture - Blood (collected 07 May 2022 18:41)  Source: .Blood Blood-Peripheral  Preliminary Report (08 May 2022 19:02):    No growth to date.    Culture - Urine (collected 07 May 2022 15:00)  Source: Clean Catch Clean Catch (Midstream)  Final Report (08 May 2022 13:41):    <10,000 CFU/mL Normal Urogenital Liseth        RADIOLOGY & ADDITIONAL TESTS:

## 2022-05-09 NOTE — PROGRESS NOTE ADULT - PROBLEM SELECTOR PLAN 5
DDx includes mitral regurgitation, tricuspid regurgitation, or hypovolemia from sepsis.  -Need to r/o significant valvular disease, patient immigrant from [place] 12y ago ?rheumatic heart disease.    -No clinical evidence of CHF, no LE swelling JVD, SOB.  -No hx of IVDU  -F/U BCx  -Does NOT currently meet [facility] criteria   -TTE ordered to r/u endocarditis DDx includes mitral regurgitation, tricuspid regurgitation, or hypovolemia from sepsis.  -Need to r/o significant valvular disease, patient immigrant from [place] 12y ago ?rheumatic heart disease.    -No clinical evidence of CHF, no LE swelling JVD, SOB.  -No hx of IVDU  -negative BCx  -Does NOT currently meet [facility] criteria   -TTE negative for valvulopathy

## 2022-05-09 NOTE — DISCHARGE NOTE PROVIDER - NSDCFUADDINST_GEN_ALL_CORE_FT
Please call [facility] at [phone]. You are set up at clinic, but need to call and verify address prior to appointment. Please call [facility]. [phone]. You are set up at clinic, but need to call and verify address prior to appointment.

## 2022-05-09 NOTE — PROGRESS NOTE ADULT - PROBLEM SELECTOR PLAN 7
140s-150s systolic during admission.  -Patient with no medical care outpatient, unknown baseline given this is acute episode of illness.  -Potentially pain induced, likely underlying Dx of essential hypertension.  - consider start on BP regimen if persistently elevated, depending on if A1C would consider ACE/ARB, and consider DC on BP medications   - needs PCP f/u, needs actual PCP as does not have. 140s-150s systolic during admission.  -Patient with no medical care outpatient, unknown baseline given this is acute episode of illness.  -Potentially pain induced, likely underlying Dx of essential hypertension.  -Will discharge on Lisinopril 10mg Qd

## 2022-05-09 NOTE — PROGRESS NOTE ADULT - PROBLEM SELECTOR PLAN 1
3 weeks of fever, reported weight loss acutely, weakness, chest pain.   - CXR w evidence of LLL PNA. Abdominal CT Left PNA.   - F/U CT chest, f/u    - Ordered quant r/o TB, sputum Cx, Sputum AFB   - C/W Azithromax and Ceftriaxone  - F/U BCx, UCx, Urine strep, Legionella Now clinically improving, WBC trending down, no fevers, hemodynamically stable  - CXR w evidence of LLL PNA. Abdominal CT Left PNA.   - F/U CT chest, f/u    - Ordered quant r/o TB  - C/W Azithromax and Ceftriaxone -> transitioned to PO Augmentin  - BCx negative

## 2022-05-09 NOTE — PROGRESS NOTE ADULT - ATTENDING COMMENTS
Pt seen and examined, chart and labs reviewed.  Briefly a 51M with daily ETOH use presents with sepsis 2/2 PNA.  He was initiated on IV Ceftriaxone/Azithromycin while admitted and clinically improved.  CT imaging confirmed presence of a L sided PNA.      #Sepsis 2/2 community acquired PNA:  - Sepsis now improved, WBC downtrending and remains afebrile  - Transition to PO augmentin to complete 5 day course  - Bcx negative, RVP negative  - Low suspicion of acute TB (no sputum production, no cavitary lesion)    #Transaminitis:  - RUQ reviewed, no evidence of cirrhosis/stones/cholecystitis  - Hepatitis panel negative  - Inflammation likely 2/2 ongoing ETOH use, although not in typical pattern of AST>ALT  - Will need outpt surveillance     #HTN:   - BP uncontrolled here, agree with starting Lisinopril     #Dispo:  Medically stable for dc home  Pt currently without insurance; will provide clinic information for sliding scale visits and referral # for [place] [facility]  Care discussed with friend at bedside with pt's permission   DC Time: 32 minutes
51yom with unknown PMH presents with 3 weeks of fevers a/w acute non-anginal chest pain, weakness, and weight loss admitted for treatment of sepsis 2/2 left lower lobe pneumonia, on CTX and Azithromycin, pending chest CT. Also on CIWA moderate alcohol use disorder. Pending TTE for holosystolic murmur.     CT a/p with PNA, will further characterize with CT chest. R/o tb, pending quant and sputum AFBs. endorses no hx of TB txt or living with family members with tb.   Will cont abx for likely bacterial pna.  f/u TTE for murmur. f/u bl clx.

## 2022-05-09 NOTE — DISCHARGE NOTE PROVIDER - CARE PROVIDERS DIRECT ADDRESSES
[email or web address]

## 2022-05-09 NOTE — PROGRESS NOTE ADULT - PROBLEM SELECTOR PLAN 6
Likely 2/2 pleurisy iso PNA vs MSK. Unlikely to be Cardiac given no findings on ECG and negative troponins  - statin therapy if needed depending on ASCVD and A1C risk factors   - outpatient f/u w PCP

## 2022-05-09 NOTE — PROGRESS NOTE ADULT - PROBLEM SELECTOR PLAN 9
DVT PPx: Lovenox qd  Diet: Regular for now    Dispo: Abx treatment, medical workup, needs a PCP on DC.      FULL CODE DVT PPx: Lovenox qd  Diet: Regular for now    Dispo: Home. Outpatient F/U- [facility] called- patient has an appointment but will need to call and verify address. Educated patient on routine PCP visit.     FULL CODE

## 2022-05-10 LAB
GAMMA INTERFERON BACKGROUND BLD IA-ACNC: 0.03 IU/ML — SIGNIFICANT CHANGE UP
M TB IFN-G BLD-IMP: NEGATIVE — SIGNIFICANT CHANGE UP
M TB IFN-G CD4+ BCKGRND COR BLD-ACNC: 0.1 IU/ML — SIGNIFICANT CHANGE UP
M TB IFN-G CD4+CD8+ BCKGRND COR BLD-ACNC: 0.09 IU/ML — SIGNIFICANT CHANGE UP
QUANT TB PLUS MITOGEN MINUS NIL: 1.61 IU/ML — SIGNIFICANT CHANGE UP

## 2022-05-12 LAB
CULTURE RESULTS: SIGNIFICANT CHANGE UP
CULTURE RESULTS: SIGNIFICANT CHANGE UP
SPECIMEN SOURCE: SIGNIFICANT CHANGE UP
SPECIMEN SOURCE: SIGNIFICANT CHANGE UP

## 2022-09-21 NOTE — ED ADULT NURSE NOTE - NS ED NURSE DISCH DISPOSITION
Impression: Puckering of macula, left eye: H35.372. OCT ordered and performed Plan: Epiretinal membranes do not usually require treatment, unless distorted vision or blurry vision occur. The main treatment consists of vitrectomy surgery with peeling off the ERM. No treatment recommended at this time. A diet rich in green vegetables such as spinach and kale is recommended. Monitor with OCT. Contact office if you experience a loss of vision or distortion. Admitted